# Patient Record
Sex: FEMALE | Race: WHITE | Employment: FULL TIME | URBAN - METROPOLITAN AREA
[De-identification: names, ages, dates, MRNs, and addresses within clinical notes are randomized per-mention and may not be internally consistent; named-entity substitution may affect disease eponyms.]

---

## 2017-04-08 ENCOUNTER — TRANSFERRED RECORDS (OUTPATIENT)
Dept: HEALTH INFORMATION MANAGEMENT | Facility: CLINIC | Age: 47
End: 2017-04-08

## 2018-01-28 ENCOUNTER — TRANSFERRED RECORDS (OUTPATIENT)
Dept: HEALTH INFORMATION MANAGEMENT | Facility: CLINIC | Age: 48
End: 2018-01-28

## 2018-02-03 ENCOUNTER — HEALTH MAINTENANCE LETTER (OUTPATIENT)
Age: 48
End: 2018-02-03

## 2018-07-06 ENCOUNTER — OFFICE VISIT (OUTPATIENT)
Dept: FAMILY MEDICINE | Facility: CLINIC | Age: 48
End: 2018-07-06
Payer: COMMERCIAL

## 2018-07-06 VITALS
SYSTOLIC BLOOD PRESSURE: 112 MMHG | TEMPERATURE: 97.9 F | BODY MASS INDEX: 21.24 KG/M2 | HEART RATE: 80 BPM | WEIGHT: 124.4 LBS | OXYGEN SATURATION: 100 % | DIASTOLIC BLOOD PRESSURE: 63 MMHG | HEIGHT: 64 IN

## 2018-07-06 DIAGNOSIS — Z11.3 SCREEN FOR STD (SEXUALLY TRANSMITTED DISEASE): ICD-10-CM

## 2018-07-06 DIAGNOSIS — Z13.6 CARDIOVASCULAR SCREENING; LDL GOAL LESS THAN 160: ICD-10-CM

## 2018-07-06 DIAGNOSIS — Z63.0 PARTNER RELATIONSHIP PROBLEMS: ICD-10-CM

## 2018-07-06 DIAGNOSIS — Z00.00 ENCOUNTER FOR ROUTINE ADULT HEALTH EXAMINATION WITHOUT ABNORMAL FINDINGS: Primary | ICD-10-CM

## 2018-07-06 DIAGNOSIS — N94.10 DYSPAREUNIA IN FEMALE: ICD-10-CM

## 2018-07-06 LAB
CHOLEST SERPL-MCNC: 159 MG/DL
GLUCOSE SERPL-MCNC: 106 MG/DL (ref 70–99)
HDLC SERPL-MCNC: 69 MG/DL
LDLC SERPL CALC-MCNC: 75 MG/DL
NONHDLC SERPL-MCNC: 90 MG/DL
T PALLIDUM AB SER QL: NONREACTIVE
TRIGL SERPL-MCNC: 73 MG/DL

## 2018-07-06 PROCEDURE — 82947 ASSAY GLUCOSE BLOOD QUANT: CPT | Performed by: FAMILY MEDICINE

## 2018-07-06 PROCEDURE — 86780 TREPONEMA PALLIDUM: CPT | Performed by: FAMILY MEDICINE

## 2018-07-06 PROCEDURE — 87624 HPV HI-RISK TYP POOLED RSLT: CPT | Performed by: FAMILY MEDICINE

## 2018-07-06 PROCEDURE — 87389 HIV-1 AG W/HIV-1&-2 AB AG IA: CPT | Performed by: FAMILY MEDICINE

## 2018-07-06 PROCEDURE — 80061 LIPID PANEL: CPT | Performed by: FAMILY MEDICINE

## 2018-07-06 PROCEDURE — 99396 PREV VISIT EST AGE 40-64: CPT | Performed by: FAMILY MEDICINE

## 2018-07-06 PROCEDURE — 87491 CHLMYD TRACH DNA AMP PROBE: CPT | Performed by: FAMILY MEDICINE

## 2018-07-06 PROCEDURE — 36415 COLL VENOUS BLD VENIPUNCTURE: CPT | Performed by: FAMILY MEDICINE

## 2018-07-06 PROCEDURE — G0145 SCR C/V CYTO,THINLAYER,RESCR: HCPCS | Performed by: FAMILY MEDICINE

## 2018-07-06 PROCEDURE — 87591 N.GONORRHOEAE DNA AMP PROB: CPT | Performed by: FAMILY MEDICINE

## 2018-07-06 SDOH — SOCIAL STABILITY - SOCIAL INSECURITY: PROBLEMS IN RELATIONSHIP WITH SPOUSE OR PARTNER: Z63.0

## 2018-07-06 NOTE — LETTER
July 13, 2018    Brianna Lew  [address] [facility] [place] [facility] [address]    Dear Brianna,  We are happy to inform you that your PAP smear result from 07/06/18 is normal.  We are now able to do a follow up test on PAP smears. The DNA test is for HPV (Human Papilloma Virus). Cervical cancer is closely linked with certain types of HPV. Your results showed no evidence of high risk HPV.  Therefore we recommend you return in 5 years for your next pap smear and HPV test.  You will still need to return to the clinic every year for an annual exam and other preventive tests.  Please contact the clinic at [phone] with any questions.  Sincerely,    Nia Chavez MD/[facility]

## 2018-07-06 NOTE — PATIENT INSTRUCTIONS
Preventive Health Recommendations  Female Ages 40 to 49    Yearly exam:     See your health care provider every year in order to  1. Review health changes.   2. Discuss preventive care.    3. Review your medicines if your doctor prescribed any.      Get a Pap test every three years (unless you have an abnormal result and your provider advises testing more often).      If you get Pap tests with HPV test, you only need to test every 5 years, unless you have an abnormal result. You do not need a Pap test if your uterus was removed (hysterectomy) and you have not had cancer.      You should be tested each year for STDs (sexually transmitted diseases), if you're at risk.     Ask your doctor if you should have a mammogram.      Have a colonoscopy (test for colon cancer) if someone in your family has had colon cancer or polyps before age 50.       Have a cholesterol test every 5 years.       Have a diabetes test (fasting glucose) after age 45. If you are at risk for diabetes, you should have this test every 3 years.    Shots: Get a flu shot each year. Get a tetanus shot every 10 years.     Nutrition:     Eat at least 5 servings of fruits and vegetables each day.    Eat whole-grain bread, whole-wheat pasta and brown rice instead of white grains and rice.    Get adequate Calcium and Vitamin D.      Lifestyle    Exercise at least 150 minutes a week (an average of 30 minutes a day, 5 days a week). This will help you control your weight and prevent disease.    Limit alcohol to one drink per day.    No smoking.     Wear sunscreen to prevent skin cancer.    See your dentist every six months for an exam and cleaning.    PLEASE CALL TO SCHEDULE YOUR MAMMOGRAM  [facility] [phone]  [facility] [phone]

## 2018-07-06 NOTE — MR AVS SNAPSHOT
After Visit Summary   7/6/2018    Brianna Lew    MRN: [id number]           Patient Information     Date Of Birth          1970        Visit Information        Provider Department      7/6/2018 9:30 AM Nia Chavez MD [facility]        Today's Diagnoses     Encounter for routine adult health examination without abnormal findings    -  1    CARDIOVASCULAR SCREENING; LDL GOAL LESS THAN 160        Screen for STD (sexually transmitted disease)          Care Instructions      Preventive Health Recommendations  Female Ages 40 to 49    Yearly exam:     See your health care provider every year in order to  1. Review health changes.   2. Discuss preventive care.    3. Review your medicines if your doctor prescribed any.      Get a Pap test every three years (unless you have an abnormal result and your provider advises testing more often).      If you get Pap tests with HPV test, you only need to test every 5 years, unless you have an abnormal result. You do not need a Pap test if your uterus was removed (hysterectomy) and you have not had cancer.      You should be tested each year for STDs (sexually transmitted diseases), if you're at risk.     Ask your doctor if you should have a mammogram.      Have a colonoscopy (test for colon cancer) if someone in your family has had colon cancer or polyps before age 50.       Have a cholesterol test every 5 years.       Have a diabetes test (fasting glucose) after age 45. If you are at risk for diabetes, you should have this test every 3 years.    Shots: Get a flu shot each year. Get a tetanus shot every 10 years.     Nutrition:     Eat at least 5 servings of fruits and vegetables each day.    Eat whole-grain bread, whole-wheat pasta and brown rice instead of white grains and rice.    Get adequate Calcium and Vitamin D.      Lifestyle    Exercise at least 150 minutes a week (an average of 30 minutes a day, 5 days a week). This will help  "you control your weight and prevent disease.    Limit alcohol to one drink per day.    No smoking.     Wear sunscreen to prevent skin cancer.    See your dentist every six months for an exam and cleaning.          Follow-ups after your visit        Who to contact     If you have questions or need follow up information about today's clinic visit or your schedule please contact [facility] directly at [phone].  Normal or non-critical lab and imaging results will be communicated to you by AdviceIQhart, letter or phone within 4 business days after the clinic has received the results. If you do not hear from us within 7 days, please contact the clinic through [employer] or phone. If you have a critical or abnormal lab result, we will notify you by phone as soon as possible.  Submit refill requests through Akermin or call your pharmacy and they will forward the refill request to us. Please allow 3 business days for your refill to be completed.          Additional Information About Your Visit        AdviceIQharPureLiFi Information     Akermin gives you secure access to your electronic health record. If you see a primary care provider, you can also send messages to your care team and make appointments. If you have questions, please call your primary care clinic.  If you do not have a primary care provider, please call [phone] and they will assist you.        Care EveryWhere ID     This is your Care EveryWhere ID. This could be used by other organizations to access your Veteran medical records  [id number]        Your Vitals Were     Pulse Temperature Height Pulse Oximetry Breastfeeding? BMI (Body Mass Index)    80 97.9  F (36.6  C) (Oral) 5' 3.5\" (1.613 m) 100% No 21.69 kg/m2       Blood Pressure from Last 3 Encounters:   07/06/18 112/63   06/08/16 108/68   12/08/15 110/62    Weight from Last 3 Encounters:   07/06/18 124 lb 6.4 oz (56.4 kg)   06/08/16 121 lb 6.4 oz (55.1 kg)   12/08/15 118 lb 14.4 oz (53.9 kg)            "   We Performed the Following     CHLAMYDIA TRACHOMATIS PCR     GLUCOSE     HIV Antigen Antibody Combo     HPV High Risk Types DNA Cervical     Lipid panel reflex to direct LDL Fasting     NEISSERIA GONORRHOEA PCR     Pap imaged thin layer screen with HPV - recommended age 30 - 65 years (select HPV order below)     Treponema Abs w Reflex to RPR and Titer        Primary Care Provider Office Phone # Fax #    Nia Chavez [phone] [address] [facility]  [address]  [facility] [address]        Equal Access to Services     KISHA CALHOUN : Hadii aad ku hadasho Soomaali, waaxda luqadaha, qaybta kaalmada adeegyada, waxay idiin hayaan adeeg kharash la'channing . So [facility] [phone].    ATENCIÓN: Si habla español, tiene a liu disposición servicios gratuitos de asistencia lingüística. [facility] [phone].    We comply with applicable federal civil rights laws and [place] laws. We do not discriminate on the basis of race, color, national origin, age, disability, sex, sexual orientation, or gender identity.            Thank you!     Thank you for choosing [facility]  for your care. Our goal is always to provide you with excellent care. Hearing back from our patients is one way we can continue to improve our services. Please take a few minutes to complete the written survey that you may receive in the mail after your visit with us. Thank you!             Your Updated Medication List - Protect others around you: Learn how to safely use, store and throw away your medicines at www.disposemymeds.org.          This list is accurate as of 7/6/18 10:29 AM.  Always use your most recent med list.                   Brand Name Dispense Instructions for use Diagnosis    NO ACTIVE MEDICATIONS

## 2018-07-06 NOTE — PROGRESS NOTES
SUBJECTIVE:   CC: Brianna Lew is an 47 year old woman who presents for preventive health visit.     Physical   Annual:     Getting at least 3 servings of Calcium per day:  NO    Bi-annual eye exam:  NO    Dental care twice a year:  Yes    Sleep apnea or symptoms of sleep apnea:  None    Diet:  Regular (no restrictions)    Frequency of exercise:  2-3 days/week    Duration of exercise:  30-45 minutes    Taking medications regularly:  Not Applicable    Additional concerns today:  No    Is fasting today- will check lipids/glucose.  Pap due next year, though since last pap, she's been dating a claudy who has a h/o HPV warts.  Due to his h/o, she elected to do the HPV vaccine (2 or the 3 shots) around the time they became sexually active.  After the vaccines, she would get yeast infx and UTI, though in discussion, she was getting these infections soon after becoming more sexually active as well.  She's now been with him for ~2-3 yrs.      She does have some concerns about the relationship- sounds like he has the tendency to be demanding and has a hard time with disagreements.  At first, she just went along with things to avoid conflict, now putting her foot down more.  Not allowing him to move in, or take relationship to next level unless they can do some good work with therapy.  Not started yet.   May end up ending the relationship, though wary of the dating scene as it's so tough as well.  She feels safe, no h/o physical or sexual abuse.  Verbal concerns- can be demeaning in talking to her in arguments.    No period for awhile- maybe a year?  Not using contraception or using protection as she has never gotten pregnant, even with fertility txts- few rounds to ovulate.        Today's PHQ-2 Score:   PHQ-2 ( 1999 Pf[employer]) 7/4/2018   Q1: Little interest or pleasure in doing things 0   Q2: Feeling down, depressed or hopeless 0   PHQ-2 Score 0   Q1: Little interest or pleasure in doing things Not at all   Q2: Feeling  down, depressed or hopeless Not at all   PHQ-2 Score 0     Abuse: Current or Past(Physical, Sexual or Emotional)- No  Do you feel safe in your environment - Yes    Social History   Substance Use Topics     Smoking status: Never Smoker     Smokeless tobacco: Never Used     Alcohol use 0.0 oz/week     0 Standard drinks or equivalent per week      Comment: social, 1 drink or less per day     Alcohol Use 7/4/2018   If you drink alcohol do you typically have greater than 3 drinks per day OR greater than 7 drinks per week? No   No flowsheet data found.    Reviewed orders with patient.  Reviewed health maintenance and updated orders accordingly - Yes  Labs reviewed in EPIC  BP Readings from Last 3 Encounters:   07/06/18 112/63   06/08/16 108/68   12/08/15 110/62    Wt Readings from Last 3 Encounters:   07/06/18 124 lb 6.4 oz (56.4 kg)   06/08/16 121 lb 6.4 oz (55.1 kg)   12/08/15 118 lb 14.4 oz (53.9 kg)                  Patient Active Problem List   Diagnosis     Primary localized osteoarthrosis, hand     CARDIOVASCULAR SCREENING; LDL GOAL LESS THAN 160     Female infertility of other specified origin     Microhematuria     Past Surgical History:   Procedure Laterality Date     HC TOOTH EXTRACTION W/FORCEP      wisdom teeth       Social History   Substance Use Topics     Smoking status: Never Smoker     Smokeless tobacco: Never Used     Alcohol use 0.0 oz/week     0 Standard drinks or equivalent per week      Comment: social, 1 drink or less per day     Family History   Problem Relation Age of Onset     Hypertension Father      Hypertension Paternal Grandmother      Hypertension Paternal Grandfather      Cerebrovascular Disease Maternal Grandmother 82     Cerebrovascular Disease Maternal Grandfather      passed away     Diabetes Paternal Grandfather      PEDROA.LOGAN. Paternal Grandfather 62     MI in early 60s     Cancer Paternal Uncle 54     pancreatic cancer- passed away at 54     Cancer Maternal Grandmother      skin      "Breast Cancer Maternal Aunt 66     HEART DISEASE Maternal Grandmother      CHF,  at 88     Cancer Paternal Aunt      tongue ca, smoker/etoh         Current Outpatient Prescriptions   Medication Sig Dispense Refill     NO ACTIVE MEDICATIONS        Allergies   Allergen Reactions     Sulfa Drugs Hives and Swelling     Facial swelling, hives, heart racing.     Recent Labs   Lab Test  18   1045  12   1312  12   1249   LDL  75   --   89   HDL  69   --   57   TRIG  73   --   81   ALT   --   21   --    CR   --   0.70   --    GFRESTIMATED   --   >90   --    GFRESTBLACK   --   >90   --    POTASSIUM   --   4.0   --    TSH   --   2.68   --         Patient under age 50, mutual decision reflected in health maintenance.      Pertinent mammograms are reviewed under the imaging tab.  History of abnormal Pap smear: NO - age 30-65 PAP every 5 years with negative HPV co-testing recommended  PAP / HPV 2014 2012 3/3/2009   PAP NIL NIL NIL     Reviewed and updated as needed this visit by clinical staff  Tobacco  Allergies  Meds         Reviewed and updated as needed this visit by Provider            Review of Systems  10 point ROS of systems including Constitutional, Eyes, Respiratory, Cardiovascular, Gastroenterology, Genitourinary, Integumentary, Muscularskeletal, Psychiatric were all negative except for pertinent positives noted in my HPI.       OBJECTIVE:   /63  Pulse 80  Temp 97.9  F (36.6  C) (Oral)  Ht 5' 3.5\" (1.613 m)  Wt 124 lb 6.4 oz (56.4 kg)  SpO2 100%  Breastfeeding? No  BMI 21.69 kg/m2  Physical Exam  GENERAL: healthy, alert and no distress  EYES: Eyes grossly normal to inspection, PERRL and conjunctivae and sclerae normal  HENT: ear canals and TM's normal, nose and mouth without ulcers or lesions  NECK: no adenopathy, no asymmetry, masses, or scars and thyroid normal to palpation  RESP: lungs clear to auscultation - no rales, rhonchi or wheezes  BREAST: normal without masses, " tenderness or nipple discharge and no palpable axillary masses or adenopathy  CV: regular rate and rhythm, normal S1 S2, no S3 or S4, no murmur, click or rub, no peripheral edema and peripheral pulses strong  ABDOMEN: soft, nontender, no hepatosplenomegaly, no masses and bowel sounds normal   (female): normal female external genitalia, normal urethral meatus, vaginal mucosa pink, moist, well rugated, and normal cervix/adnexa/uterus without masses or discharge  MS: no gross musculoskeletal defects noted, no edema  SKIN: no suspicious lesions or rashes  NEURO: Normal strength and tone, mentation intact and speech normal  PSYCH: mentation appears normal, affect normal/bright    Diagnostic Test Results:  Results for orders placed or performed in visit on 07/06/18   GLUCOSE   Result Value Ref Range    Glucose 106 (H) 70 - 99 mg/dL   Lipid panel reflex to direct LDL Fasting   Result Value Ref Range    Cholesterol 159 <200 mg/dL    Triglycerides 73 <150 mg/dL    HDL Cholesterol 69 >49 mg/dL    LDL Cholesterol Calculated 75 <100 mg/dL    Non HDL Cholesterol 90 <130 mg/dL   HIV Antigen Antibody Combo   Result Value Ref Range    HIV Antigen Antibody Combo Nonreactive NR^Nonreactive       Treponema Abs w Reflex to RPR and Titer   Result Value Ref Range    Treponema Antibodies Nonreactive NR^Nonreactive       ASSESSMENT/PLAN:       ICD-10-CM    1. Encounter for routine adult health examination without abnormal findings Z00.00 GLUCOSE     Pap imaged thin layer screen with HPV - recommended age 30 - 65 years (select HPV order below)     HPV High Risk Types DNA Cervical   2. CARDIOVASCULAR SCREENING; LDL GOAL LESS THAN 160 Z13.6 Lipid panel reflex to direct LDL Fasting   3. Screen for STD (sexually transmitted disease) Z11.3 NEISSERIA GONORRHOEA PCR     CHLAMYDIA TRACHOMATIS PCR     HIV Antigen Antibody Combo     Treponema Abs w Reflex to RPR and Titer   4. Dyspareunia in female N94.10    5. Partner relationship problems Z63.0   "    CPE - Discussed diet, calcium and exercise.  Went over Self Breast Exam.  Thin prep pap was done.  She has a h/o dyspareunia, bit better with more frequent intercourse.  In past has been intolerant of pap smear (even w/ pediatric speculum)- this time did well with pediatric speculum.  Eyes and Teeth or UTD or recommended f/u.  No immunizations needed today.  See orders below for tests ordered and screening needed.   Will do fasting lipid/glucose today and STD screen.  Pt has some concerns re: current relationship of a couple yrs.  Feels safe, but does voice real concerns about him being controlling.  Planning on doing counseling to move forward, not sure she won't end it.  Will call if she would like a referral for therapy.        Answers for HPI/ROS submitted by the patient on 7/4/2018   PHQ-2 Score: 0.        COUNSELING:  Reviewed preventive health counseling, as reflected in patient instructions    BP Readings from Last 1 Encounters:   07/06/18 112/63     Estimated body mass index is 21.69 kg/(m^2) as calculated from the following:    Height as of this encounter: 5' 3.5\" (1.613 m).    Weight as of this encounter: 124 lb 6.4 oz (56.4 kg).       reports that she has never smoked. She has never used smokeless tobacco.      Counseling Resources:  ATP IV Guidelines  Pooled Cohorts Equation Calculator  Breast Cancer Risk Calculator  FRAX Risk Assessment  ICSI Preventive Guidelines  Dietary Guidelines for Americans, 2010  USDA's MyPlate  ASA Prophylaxis  Lung CA Screening    Nia Chavez MD  [facility]  "

## 2018-07-08 LAB — HIV 1+2 AB+HIV1 P24 AG SERPL QL IA: NONREACTIVE

## 2018-07-10 LAB
C TRACH DNA SPEC QL NAA+PROBE: NEGATIVE
COPATH REPORT: NORMAL
N GONORRHOEA DNA SPEC QL NAA+PROBE: NEGATIVE
PAP: NORMAL
SPECIMEN SOURCE: NORMAL
SPECIMEN SOURCE: NORMAL

## 2018-07-12 LAB
FINAL DIAGNOSIS: NORMAL
HPV HR 12 DNA CVX QL NAA+PROBE: NEGATIVE
HPV16 DNA SPEC QL NAA+PROBE: NEGATIVE
HPV18 DNA SPEC QL NAA+PROBE: NEGATIVE
SPECIMEN DESCRIPTION: NORMAL
SPECIMEN SOURCE CVX/VAG CYTO: NORMAL

## 2018-07-12 NOTE — PROGRESS NOTES
Here are your lab results from your recent visit...  -Your STD labs all came back negative (gonorrhea, chlamydia, HIV and syphilis).  -Your cholesterol panel looks very good with a low LDL (the bad cholesterol) and a high HDL (the good cholesterol).   -Your glucose is just slightly high at 106 (<100 is normal) as I believe you were fasting?  If you weren't completely fasting, it is fine.  We should just check this again next year when you are fasting.     It was good to see you!  Please let me know if you have any questions.  Best,   Gulshan Chavez MD

## 2018-07-16 PROBLEM — Z63.0 PARTNER RELATIONSHIP PROBLEMS: Status: ACTIVE | Noted: 2018-07-16

## 2018-12-17 ENCOUNTER — HOSPITAL ENCOUNTER (OUTPATIENT)
Dept: MAMMOGRAPHY | Facility: CLINIC | Age: 48
Discharge: HOME | End: 2018-12-17
Attending: FAMILY MEDICINE | Admitting: FAMILY MEDICINE
Payer: COMMERCIAL

## 2018-12-17 ENCOUNTER — MYC MEDICAL ADVICE (OUTPATIENT)
Dept: FAMILY MEDICINE | Facility: CLINIC | Age: 48
End: 2018-12-17

## 2018-12-17 DIAGNOSIS — Z12.31 VISIT FOR SCREENING MAMMOGRAM: ICD-10-CM

## 2018-12-17 PROCEDURE — 77063 BREAST TOMOSYNTHESIS BI: CPT

## 2019-01-29 ENCOUNTER — OFFICE VISIT (OUTPATIENT)
Dept: FAMILY MEDICINE | Facility: CLINIC | Age: 49
End: 2019-01-29
Payer: COMMERCIAL

## 2019-01-29 VITALS
DIASTOLIC BLOOD PRESSURE: 77 MMHG | WEIGHT: 127.6 LBS | SYSTOLIC BLOOD PRESSURE: 123 MMHG | HEART RATE: 82 BPM | HEIGHT: 64 IN | BODY MASS INDEX: 21.78 KG/M2 | TEMPERATURE: 98 F | OXYGEN SATURATION: 100 %

## 2019-01-29 DIAGNOSIS — H61.21 IMPACTED CERUMEN OF RIGHT EAR: Primary | ICD-10-CM

## 2019-01-29 DIAGNOSIS — Z63.9 RELATIONSHIP DYSFUNCTION: ICD-10-CM

## 2019-01-29 PROCEDURE — 99213 OFFICE O/P EST LOW 20 MIN: CPT | Performed by: FAMILY MEDICINE

## 2019-01-29 SDOH — SOCIAL STABILITY - SOCIAL INSECURITY: PROBLEM RELATED TO PRIMARY SUPPORT GROUP, UNSPECIFIED: Z63.9

## 2019-01-29 ASSESSMENT — MIFFLIN-ST. JEOR: SCORE: 1190.85

## 2019-01-29 NOTE — NURSING NOTE
"Chief Complaint   Patient presents with     Ear Problem     /77   Pulse 82   Temp 98  F (36.7  C) (Oral)   Ht 1.613 m (5' 3.5\")   Wt 57.9 kg (127 lb 9.6 oz)   SpO2 100%   BMI 22.25 kg/m   Estimated body mass index is 22.25 kg/m  as calculated from the following:    Height as of this encounter: 1.613 m (5' 3.5\").    Weight as of this encounter: 57.9 kg (127 lb 9.6 oz).  Medication Reconciliation: complete      Health Maintenance that is potentially due pending provider review:  NONE    n/a    OSCAR Loja  "

## 2019-01-29 NOTE — PROGRESS NOTES
SUBJECTIVE:   Brianna Lew is a 48 year old female who presents to clinic today for the following health issues:    Ear Problem     Duration: x5-6 days ago    Description (location/character/radiation): RT ear     Intensity:  moderate    Accompanying signs and symptoms: RT ear feels plugged - sx worse in the morning     History (similar episodes/previous evaluation): None    Precipitating or alleviating factors: None    Therapies tried and outcome: None     --Grandmother would have to come in to get ears flushed.  Sister goes in to get ears cleared out as well.  Awhile ago, if she pressed on her lower ear, it felt like ear canal was opening/closing.  Last week, when she woke up, it felt like her sinuses have been oozing into her ears.  Gets better throughout the day.  Friday- felt worse, echoing, tried to pop her ears, and it suddenly popped and felt a lot better.  Each day since then, though, it's gotten a bit worse.      --Got mammogram done- same day she heard about her MCousin getting dx with breast cancer recently- cousin is 35 yrs old.      --Relationship issues- feels things are a bit better than at last visit, but still having trouble with communication, and finds herself avoiding tougher conversations to avoid bad reactions on his part.  She does feel he's trying, though, and knows it's mostly how he was brought up (met his family which was helpful to see for her). She thinks he would be open and willing to do couples counseling, and would be interested in a referral today.      Problem list and histories reviewed & adjusted, as indicated.  Additional history: as documented      Patient Active Problem List   Diagnosis     Primary localized osteoarthrosis, hand     CARDIOVASCULAR SCREENING; LDL GOAL LESS THAN 160     Female infertility of other specified origin     Microhematuria     Dyspareunia in female     Partner relationship problems     Past Surgical History:   Procedure Laterality Date     HC  TOOTH EXTRACTION W/FORCEP      wisdom teeth       Social History     Tobacco Use     Smoking status: Never Smoker     Smokeless tobacco: Never Used   Substance Use Topics     Alcohol use: Yes     Alcohol/week: 0.0 oz     Comment: social, 1 drink or less per day     Family History   Problem Relation Age of Onset     Hypertension Father      Cerebrovascular Disease Maternal Grandmother 82     Cancer Maternal Grandmother         skin     Heart Disease Maternal Grandmother         CHF,  at 88     Cerebrovascular Disease Maternal Grandfather         passed away     Hypertension Paternal Grandmother      Hypertension Paternal Grandfather      Diabetes Paternal Grandfather      C.A.D. Paternal Grandfather 62        MI in early 60s     Cancer Paternal Uncle 54        pancreatic cancer- passed away at 54     Breast Cancer Maternal Aunt 66     Cancer Paternal Aunt         tongue ca, smoker/etoh     Breast Cancer Maternal Cousin 35        mastectomy, chemo planned         Current Outpatient Medications   Medication Sig Dispense Refill     NO ACTIVE MEDICATIONS        Allergies   Allergen Reactions     Sulfa Drugs Hives and Swelling     Facial swelling, hives, heart racing.     Recent Labs   Lab Test 18  1045 12  1312 12  1249   LDL 75  --  89   HDL 69  --  57   TRIG 73  --  81   ALT  --  21  --    CR  --  0.70  --    GFRESTIMATED  --  >90  --    GFRESTBLACK  --  >90  --    POTASSIUM  --  4.0  --    TSH  --  2.68  --       BP Readings from Last 3 Encounters:   19 123/77   18 112/63   16 108/68    Wt Readings from Last 3 Encounters:   19 57.9 kg (127 lb 9.6 oz)   18 56.4 kg (124 lb 6.4 oz)   16 55.1 kg (121 lb 6.4 oz)            Labs reviewed in EPIC    Reviewed and updated as needed this visit by clinical staff  Tobacco  Allergies  Meds  Problems  Med Hx  Surg Hx  Fam Hx       Reviewed and updated as needed this visit by Provider  Tobacco  Allergies  Meds   "Problems  Med Hx  Surg Hx  Fam Hx         ROS:  Constitutional, HEENT, cardiovascular, pulmonary, gi and gu systems are negative, except as otherwise noted.    OBJECTIVE:     /77   Pulse 82   Temp 98  F (36.7  C) (Oral)   Ht 1.613 m (5' 3.5\")   Wt 57.9 kg (127 lb 9.6 oz)   SpO2 100%   BMI 22.25 kg/m    Body mass index is 22.25 kg/m .  GENERAL APPEARANCE: pleasant, alert and no distress     EYES: PERRL, sclera clear     HENT: nares clear, oropharynx without erythema, swelling or exudate, sinuses non-tender to palpation, right ear canal filled with soft wax, L canal mostly clear, and TM normal with good light reflex, R TM normal after canal cleared     NECK: no adenopathy, no asymmetry, masses, or scars and thyroid normal to palpation     RESP: lungs clear to auscultation - no rales, rhonchi or wheezes     CV: regular rates and rhythm, normal S1 S2, no S3 or S4 and no murmur, click or rub      Ext: warm, dry, no edema     Psych: full range affect, normal speech and grooming, judgement and insight intact     Diagnostic Test Results:  Results for orders placed or performed during the hospital encounter of 12/17/18   MA Screen Bilateral w/Donta    Narrative    Examination: Bilateral digital screening mammography with computer  aided detection including digital breast tomosynthesis, 12/17/2018  3:25 PM.    Comparison: 03/01/2007    History: No current breast concerns. Maternal aunt and cousin with  breast cancer.    BREAST DENSITY: Heterogeneously dense.    COMMENTS:  No suspicious finding.      Impression    IMPRESSION: BI-RADS CATEGORY: 1 -  NEGATIVE.    RECOMMENDED FOLLOW-UP: Annual Mammography.      The patient will be notified of the results.     JOVITA HORNE MD       ASSESSMENT/PLAN:       ICD-10-CM    1. Impacted cerumen of right ear H61.21    2. Relationship dysfunction Z63.9 MENTAL HEALTH REFERRAL  - Adult; Outpatient Treatment; Individual/Couples/Family/Group Therapy/Health Psychology; [facility]: " [facility] [phone]; We will contact you to schedule the appointment or please call with any questions     Cerumen in R ear- blocked, but cleared with ear flushing, with significant improvement in hearing and exam normal afterwards.  Discussed option of occasional use of debrox OTC if she'd like another option if sx's return, or can come in for ear flushing prn as well.    Relationship communication issues- issues may be a bit better, but they continue, and inhibit communication.  Pt interested in referral for couples counseling- referral placed.    Recent mammogram normal.    Nia Chavez MD  [facility] [place]

## 2019-10-04 ENCOUNTER — HEALTH MAINTENANCE LETTER (OUTPATIENT)
Age: 49
End: 2019-10-04

## 2019-11-14 ENCOUNTER — OFFICE VISIT (OUTPATIENT)
Dept: FAMILY MEDICINE | Facility: CLINIC | Age: 49
End: 2019-11-14
Payer: COMMERCIAL

## 2019-11-14 VITALS
TEMPERATURE: 98.4 F | WEIGHT: 120.2 LBS | HEIGHT: 64 IN | BODY MASS INDEX: 20.52 KG/M2 | SYSTOLIC BLOOD PRESSURE: 136 MMHG | RESPIRATION RATE: 15 BRPM | DIASTOLIC BLOOD PRESSURE: 73 MMHG | HEART RATE: 89 BPM | OXYGEN SATURATION: 100 %

## 2019-11-14 DIAGNOSIS — R19.7 DIARRHEA OF PRESUMED INFECTIOUS ORIGIN: Primary | ICD-10-CM

## 2019-11-14 LAB
ALBUMIN SERPL-MCNC: 4.1 G/DL (ref 3.4–5)
ALP SERPL-CCNC: 79 U/L (ref 40–150)
ALT SERPL W P-5'-P-CCNC: 36 U/L (ref 0–50)
ANION GAP SERPL CALCULATED.3IONS-SCNC: 7 MMOL/L (ref 3–14)
AST SERPL W P-5'-P-CCNC: 16 U/L (ref 0–45)
BASOPHILS # BLD AUTO: 0 10E9/L (ref 0–0.2)
BASOPHILS NFR BLD AUTO: 0.3 %
BILIRUB SERPL-MCNC: 0.4 MG/DL (ref 0.2–1.3)
BUN SERPL-MCNC: 7 MG/DL (ref 7–30)
CALCIUM SERPL-MCNC: 8.8 MG/DL (ref 8.5–10.1)
CHLORIDE SERPL-SCNC: 106 MMOL/L (ref 94–109)
CO2 SERPL-SCNC: 25 MMOL/L (ref 20–32)
CREAT SERPL-MCNC: 0.72 MG/DL (ref 0.52–1.04)
DIFFERENTIAL METHOD BLD: ABNORMAL
EOSINOPHIL # BLD AUTO: 0 10E9/L (ref 0–0.7)
EOSINOPHIL NFR BLD AUTO: 0.6 %
ERYTHROCYTE [DISTWIDTH] IN BLOOD BY AUTOMATED COUNT: 12.6 % (ref 10–15)
GFR SERPL CREATININE-BSD FRML MDRD: >90 ML/MIN/{1.73_M2}
GLUCOSE SERPL-MCNC: 115 MG/DL (ref 70–99)
HCT VFR BLD AUTO: 38.9 % (ref 35–47)
HGB BLD-MCNC: 12.8 G/DL (ref 11.7–15.7)
LYMPHOCYTES # BLD AUTO: 0.9 10E9/L (ref 0.8–5.3)
LYMPHOCYTES NFR BLD AUTO: 25.8 %
MCH RBC QN AUTO: 28.8 PG (ref 26.5–33)
MCHC RBC AUTO-ENTMCNC: 32.9 G/DL (ref 31.5–36.5)
MCV RBC AUTO: 88 FL (ref 78–100)
MONOCYTES # BLD AUTO: 0.2 10E9/L (ref 0–1.3)
MONOCYTES NFR BLD AUTO: 4.7 %
NEUTROPHILS # BLD AUTO: 2.5 10E9/L (ref 1.6–8.3)
NEUTROPHILS NFR BLD AUTO: 68.6 %
PLATELET # BLD AUTO: 262 10E9/L (ref 150–450)
POTASSIUM SERPL-SCNC: 3.4 MMOL/L (ref 3.4–5.3)
PROT SERPL-MCNC: 7.3 G/DL (ref 6.8–8.8)
RBC # BLD AUTO: 4.44 10E12/L (ref 3.8–5.2)
SODIUM SERPL-SCNC: 138 MMOL/L (ref 133–144)
WBC # BLD AUTO: 3.6 10E9/L (ref 4–11)

## 2019-11-14 PROCEDURE — 99213 OFFICE O/P EST LOW 20 MIN: CPT | Performed by: PHYSICIAN ASSISTANT

## 2019-11-14 PROCEDURE — 80053 COMPREHEN METABOLIC PANEL: CPT | Performed by: PHYSICIAN ASSISTANT

## 2019-11-14 PROCEDURE — 36415 COLL VENOUS BLD VENIPUNCTURE: CPT | Performed by: PHYSICIAN ASSISTANT

## 2019-11-14 PROCEDURE — 85025 COMPLETE CBC W/AUTO DIFF WBC: CPT | Performed by: PHYSICIAN ASSISTANT

## 2019-11-14 ASSESSMENT — MIFFLIN-ST. JEOR: SCORE: 1152.28

## 2019-11-14 NOTE — PROGRESS NOTES
"Subjective     Brianna Lew is a 49 year old female who presents to clinic today for the following health issues:    HPI   Diarrhea      Duration: 5 days    Description:       Consistency of stool: varying consistencies; sometimes has been very light (almost grayish white)        Blood in stool: no        Number of loose stools past 24 hours: 2    Intensity:  mild    Accompanying signs and symptoms:       Fever: no        Nausea/vomitting: YES       Abdominal pain: no        Weight loss: no     History (recent antibiotics or travel/ill contacts/med changes/testing done): restaurant food    Precipitating or alleviating factors: none    Therapies tried and outcome: pt has been avoiding certain foods and trying to drink lots of fluids.        BP Readings from Last 3 Encounters:   11/14/19 136/73   01/29/19 123/77   07/06/18 112/63    Wt Readings from Last 3 Encounters:   11/14/19 54.5 kg (120 lb 3.2 oz)   01/29/19 57.9 kg (127 lb 9.6 oz)   07/06/18 56.4 kg (124 lb 6.4 oz)                      Reviewed and updated as needed this visit by Provider         Review of Systems   ROS COMP: Constitutional, HEENT, cardiovascular, pulmonary, gi and gu systems are negative, except as otherwise noted.      Objective    /73   Pulse 89   Temp 98.4  F (36.9  C) (Oral)   Resp 15   Ht 1.613 m (5' 3.5\")   Wt 54.5 kg (120 lb 3.2 oz)   LMP 10/01/2019 (Approximate)   SpO2 100%   BMI 20.96 kg/m    Body mass index is 20.96 kg/m .  Physical Exam   GENERAL: alert and no distress  EYES: Eyes grossly normal to inspection  HENT: ear canals and TM's normal, nose and mouth without ulcers or lesions  PSYCH: mentation appears normal, affect normal/bright    Diagnostic Test Results:  Labs reviewed in Epic        Assessment & Plan     1. Diarrhea of presumed infectious origin  May still be resolving viral, but will start with some labs and possible stool cultures.    - CBC with platelets differential  - Comprehensive metabolic " panel  - Enteric Bacteria and Virus Panel by [facility] St[facility]; Future           Return in about 1 week (around 11/21/2019) for If symptoms persist or worsen.    Bryan Grubbs PA-C  [facility]

## 2019-11-14 NOTE — NURSING NOTE
"Chief Complaint   Patient presents with     Gastrointestinal Problem     Diarrhea x5 days off and on (has concerns of food poisoning)     /73   Pulse 89   Temp 98.4  F (36.9  C) (Oral)   Resp 15   Ht 1.613 m (5' 3.5\")   Wt 54.5 kg (120 lb 3.2 oz)   LMP 10/01/2019 (Approximate)   SpO2 100%   BMI 20.96 kg/m   Estimated body mass index is 20.96 kg/m  as calculated from the following:    Height as of this encounter: 1.613 m (5' 3.5\").    Weight as of this encounter: 54.5 kg (120 lb 3.2 oz).  Medication Reconciliation: complete        Health Maintenance Due Pending Provider Review:  NONE    n/a    Jillian Montague MA  [facility]  [phone]  "

## 2019-11-16 DIAGNOSIS — R19.7 DIARRHEA OF PRESUMED INFECTIOUS ORIGIN: ICD-10-CM

## 2019-11-16 PROCEDURE — 87506 IADNA-DNA/RNA PROBE TQ 6-11: CPT | Performed by: PHYSICIAN ASSISTANT

## 2019-11-18 LAB
C COLI+JEJUNI+LARI FUSA STL QL NAA+PROBE: NOT DETECTED
EC STX1 GENE STL QL NAA+PROBE: NOT DETECTED
EC STX2 GENE STL QL NAA+PROBE: NOT DETECTED
ENTERIC PATHOGEN COMMENT: ABNORMAL
NOROV GI+II ORF1-ORF2 JNC STL QL NAA+PR: ABNORMAL
RVA NSP5 STL QL NAA+PROBE: NOT DETECTED
SALMONELLA SP RPOD STL QL NAA+PROBE: NOT DETECTED
SHIGELLA SP+EIEC IPAH STL QL NAA+PROBE: NOT DETECTED
V CHOL+PARA RFBL+TRKH+TNAA STL QL NAA+PR: NOT DETECTED
Y ENTERO RECN STL QL NAA+PROBE: NOT DETECTED

## 2019-11-20 ENCOUNTER — TELEPHONE (OUTPATIENT)
Dept: FAMILY MEDICINE | Facility: CLINIC | Age: 49
End: 2019-11-20

## 2019-11-20 NOTE — TELEPHONE ENCOUNTER
Reason for Call:  Other Return to work    Detailed comments: Brianna calling in to see if she is ok to return to work after the lab detected Norovirus.     Please call back.     Phone Number Patient can be reached at: Cell number on file:    Telephone Information:   Mobile [phone]       Best Time: any    Can we leave a detailed message on this number? YES    Call taken on 11/20/2019 at 9:34 AM by Anton Pillai

## 2019-11-20 NOTE — TELEPHONE ENCOUNTER
JS    Spoke with patient.  Asking if ok to return to work  States she could not reply to you [employer] message regarding lab result.    States she is feeling much better.  Has low energy due to how sick she was last week and not eating  Started eating food on Monday 11/18 and now eating regular diet without difficulty.    Returned to work today.  Reviewed what Norovirus and how people contact it.    Informed patient fine to return as long as feeling better back, back to eating.    Advised patient get as much rest if she can if she is feeling fatigued.     Anything you want to add?  Brandie Melchor, PHILL

## 2019-11-26 ENCOUNTER — OFFICE VISIT (OUTPATIENT)
Dept: FAMILY MEDICINE | Facility: CLINIC | Age: 49
End: 2019-11-26
Payer: COMMERCIAL

## 2019-11-26 VITALS
HEIGHT: 64 IN | WEIGHT: 121.13 LBS | TEMPERATURE: 98 F | HEART RATE: 76 BPM | OXYGEN SATURATION: 100 % | DIASTOLIC BLOOD PRESSURE: 68 MMHG | SYSTOLIC BLOOD PRESSURE: 118 MMHG | RESPIRATION RATE: 14 BRPM | BODY MASS INDEX: 20.68 KG/M2

## 2019-11-26 DIAGNOSIS — R31.29 MICROHEMATURIA: ICD-10-CM

## 2019-11-26 DIAGNOSIS — Z63.0 PARTNER RELATIONSHIP PROBLEMS: ICD-10-CM

## 2019-11-26 DIAGNOSIS — N94.10 DYSPAREUNIA IN FEMALE: ICD-10-CM

## 2019-11-26 DIAGNOSIS — A08.11 NOROVIRUS: ICD-10-CM

## 2019-11-26 DIAGNOSIS — Z13.6 CARDIOVASCULAR SCREENING; LDL GOAL LESS THAN 160: ICD-10-CM

## 2019-11-26 DIAGNOSIS — Z00.00 ROUTINE GENERAL MEDICAL EXAMINATION AT A HEALTH CARE FACILITY: Primary | ICD-10-CM

## 2019-11-26 PROCEDURE — 99396 PREV VISIT EST AGE 40-64: CPT | Performed by: FAMILY MEDICINE

## 2019-11-26 SDOH — SOCIAL STABILITY - SOCIAL INSECURITY: PROBLEMS IN RELATIONSHIP WITH SPOUSE OR PARTNER: Z63.0

## 2019-11-26 ASSESSMENT — MIFFLIN-ST. JEOR: SCORE: 1156.48

## 2019-11-26 ASSESSMENT — PAIN SCALES - GENERAL: PAINLEVEL: NO PAIN (0)

## 2019-11-26 NOTE — PATIENT INSTRUCTIONS
PLEASE CALL TO SCHEDULE YOUR MAMMOGRAM  [facility] [phone]  [facility] [phone]  [facility]   [phone]  Central Scheduling (all locations) [phone]    If you are under/uninsured, we recommend you contact the [facility]. They offer mammograms on a sliding fee charge. You can schedule with them at [phone].         Preventive Health Recommendations  Female Ages 40 to 49    Yearly exam:     See your health care provider every year in order to  1. Review health changes.   2. Discuss preventive care.    3. Review your medicines if your doctor prescribed any.      Get a Pap test every three years (unless you have an abnormal result and your provider advises testing more often).      If you get Pap tests with HPV test, you only need to test every 5 years, unless you have an abnormal result. You do not need a Pap test if your uterus was removed (hysterectomy) and you have not had cancer.      You should be tested each year for STDs (sexually transmitted diseases), if you're at risk.     Ask your doctor if you should have a mammogram.      Have a colonoscopy (test for colon cancer) if someone in your family has had colon cancer or polyps before age 50.       Have a cholesterol test every 5 years.       Have a diabetes test (fasting glucose) after age 45. If you are at risk for diabetes, you should have this test every 3 years.    Shots: Get a flu shot each year. Get a tetanus shot every 10 years.     Nutrition:     Eat at least 5 servings of fruits and vegetables each day.    Eat whole-grain bread, whole-wheat pasta and brown rice instead of white grains and rice.    Get adequate Calcium and Vitamin D.      Lifestyle    Exercise at least 150 minutes a week (an average of 30 minutes a day, 5 days a week). This will help you control your weight and prevent disease.    Limit alcohol to one drink per day.    No smoking.     Wear sunscreen to prevent skin  cancer.    See your dentist every six months for an exam and cleaning.

## 2019-11-26 NOTE — NURSING NOTE
"Chief Complaint   Patient presents with     Physical     /68 (BP Location: Left arm, Patient Position: Sitting, Cuff Size: Adult Regular)   Pulse 76   Temp 98  F (36.7  C) (Oral)   Resp 14   Ht 1.613 m (5' 3.5\")   Wt 54.9 kg (121 lb 2 oz)   LMP  (Approximate)   SpO2 100%   Breastfeeding No   BMI 21.12 kg/m   Estimated body mass index is 21.12 kg/m  as calculated from the following:    Height as of this encounter: 1.613 m (5' 3.5\").    Weight as of this encounter: 54.9 kg (121 lb 2 oz).  bp completed using cuff size: regular      Health Maintenance addressed:  NONE    N/a  Sunita Daniels CMA on 11/26/2019 at 2:38 PM                "

## 2019-11-26 NOTE — PROGRESS NOTES
SUBJECTIVE:   CC: Brianna Lew is an 49 year old woman who presents for preventive health visit.     Healthy Habits:     Getting at least 3 servings of Calcium per day:  Yes    Bi-annual eye exam:  NO    Dental care twice a year:  Yes    Sleep apnea or symptoms of sleep apnea:  None    Diet:  Regular (no restrictions)    Frequency of exercise:  2-3 days/week    Duration of exercise:  30-45 minutes    Taking medications regularly:  Not Applicable    Barriers to taking medications:  Not applicable    PHQ-2 Total Score: 0    Additional concerns today:  Yes    Thinks she got food poisoning ~2 wks ago.  Vomited that night (after salad at [employer]), diarrhea that night, and next day.  Then another round of diarrhea a few days later.  Still feeling a little nausea, but hasn't had diarrhea for several days.  She's been eating better the past week.    She did come to be seen here at clinic, did stool testing- positive for norovirus.   No txt, just gradually improved.  Did lose ~8 lbs at the worst of it however.    Will rtc for fasting labs.    She does take Vit D and a MVI.  Takes them most days.    Dyspareunia-   PT helped, yoga helped, more frequent intercourse helped.  Now it's more painful again- has been doing less of everything.    She continues to have relationship issues with her partner- with him for several yrs now, but won't move in with him or consider marriage as he's not a great partner.  Major reservations about him, though not enough to break things off with him.  For example, he shut off his phone [place], finally called her when when he got home which was when she was really ill with norovirus, but then wouldn't come over to bring her food even though she was out of anything to eat and was too ill to go out.  She knows she'd have done it for him, and she's also asked him to not shut off his phone when he goes away, but he won't do  it.    For the relationship concerns, she's had a referral for therapy in the past, but hasn't acted on it.  Now thinks she'd be interested in individual counseling- needs to figure out where she's at in all of it before couples.    For the dypareunia, she has the PT exercises/tools- feels like she can do them on her own at home, doesn't want a new PT referral at least at this point.      Upper back tightness.  Massage therapist- feels like it really helps her upper back, especially if she goes monthly.  He also did some pelvic/solar plexis massage when he heard about her pelvic discomfort- unsure at this point yet if it will help or not with the dyspareunia.  [facility]- was at [facility] in the past.          Today's PHQ-2 Score:   PHQ-2 ( 1999 [employer]) 11/26/2019   Q1: Little interest or pleasure in doing things 0   Q2: Feeling down, depressed or hopeless 0   PHQ-2 Score 0   Q1: Little interest or pleasure in doing things Not at all   Q2: Feeling down, depressed or hopeless Not at all   PHQ-2 Score 0       Abuse: Current or Past(Physical, Sexual or Emotional)- No  Do you feel safe in your environment? Yes        Social History     Tobacco Use     Smoking status: Never Smoker     Smokeless tobacco: Never Used   Substance Use Topics     Alcohol use: Yes     Alcohol/week: 0.0 standard drinks     Comment: social, 1 drink or less per day     If you drink alcohol do you typically have >3 drinks per day or >7 drinks per week? No    Alcohol Use 11/26/2019   Prescreen: >3 drinks/day or >7 drinks/week? No   Prescreen: >3 drinks/day or >7 drinks/week? -   No flowsheet data found.    Reviewed orders with patient.  Reviewed health maintenance and updated orders accordingly - Yes    Lab work is in process  Labs reviewed in EPIC  BP Readings from Last 3 Encounters:   11/26/19 118/68   11/14/19 136/73   01/29/19 123/77    Wt Readings from Last 3 Encounters:   11/26/19 54.9 kg (121 lb 2 oz)   11/14/19 54.5 kg (120  lb 3.2 oz)   19 57.9 kg (127 lb 9.6 oz)                  Patient Active Problem List   Diagnosis     Primary localized osteoarthrosis, hand     CARDIOVASCULAR SCREENING; LDL GOAL LESS THAN 160     Female infertility of other specified origin     Microhematuria     Dyspareunia in female     Partner relationship problems     Past Surgical History:   Procedure Laterality Date     HC TOOTH EXTRACTION W/FORCEP      wisdom teeth       Social History     Tobacco Use     Smoking status: Never Smoker     Smokeless tobacco: Never Used   Substance Use Topics     Alcohol use: Yes     Alcohol/week: 0.0 standard drinks     Comment: social, 1 drink or less per day     Family History   Problem Relation Age of Onset     Hypertension Father      Cerebrovascular Disease Maternal Grandmother 82     Cancer Maternal Grandmother         skin     Heart Disease Maternal Grandmother         CHF,  at 88     Cerebrovascular Disease Maternal Grandfather         passed away     Hypertension Paternal Grandmother      Hypertension Paternal Grandfather      Diabetes Paternal Grandfather      C.A.D. Paternal Grandfather 62        MI in early 60s     Cancer Paternal Uncle 54        pancreatic cancer- passed away at 54     Breast Cancer Maternal Aunt 66     Cancer Paternal Aunt         tongue ca, smoker/etoh     Breast Cancer Maternal Cousin 35        mastectomy, chemo planned         Current Outpatient Medications   Medication Sig Dispense Refill     NO ACTIVE MEDICATIONS        Allergies   Allergen Reactions     Sulfa Drugs Hives and Swelling     Facial swelling, hives, heart racing.     Recent Labs   Lab Test 19  0832 18  1045 12  1312 12  1249   LDL  --  75  --  89   HDL  --  69  --  57   TRIG  --  73  --  81   ALT 36  --  21  --    CR 0.72  --  0.70  --    GFRESTIMATED >90  --  >90  --    GFRESTBLACK >90  --  >90  --    POTASSIUM 3.4  --  4.0  --    TSH  --   --  2.68  --         Mammogram Screening: Patient  "under age 50, mutual decision reflected in health maintenance.      Pertinent mammograms are reviewed under the imaging tab.  History of abnormal Pap smear: NO - age 30-65 PAP every 5 years with negative HPV co-testing recommended  PAP / HPV Latest Ref Rng & Units 7/6/2018 12/2/2014 2/24/2012   PAP - NIL NIL NIL   HPV 16 DNA NEG:Negative Negative - -   HPV 18 DNA NEG:Negative Negative - -   OTHER HR HPV NEG:Negative Negative - -     Reviewed and updated as needed this visit by clinical staff  Tobacco  Allergies  Meds  Problems  Med Hx  Surg Hx  Fam Hx         Reviewed and updated as needed this visit by Provider  Tobacco  Allergies  Meds  Problems  Med Hx  Surg Hx  Fam Hx            Review of Systems  10 point ROS of systems including Constitutional, Eyes, Respiratory, Cardiovascular, Gastroenterology, Genitourinary, Integumentary, Muscularskeletal, Psychiatric were all negative except for pertinent positives noted in my HPI.       OBJECTIVE:   /68 (BP Location: Left arm, Patient Position: Sitting, Cuff Size: Adult Regular)   Pulse 76   Temp 98  F (36.7  C) (Oral)   Resp 14   Ht 1.613 m (5' 3.5\")   Wt 54.9 kg (121 lb 2 oz)   LMP  (Approximate)   SpO2 100%   Breastfeeding No   BMI 21.12 kg/m    Physical Exam  GENERAL: healthy, alert and no distress  EYES: Eyes grossly normal to inspection, PERRL and conjunctivae and sclerae normal  HENT: ear canals and TM's normal, nose and mouth without ulcers or lesions  NECK: no adenopathy, no asymmetry, masses, or scars and thyroid normal to palpation  RESP: lungs clear to auscultation - no rales, rhonchi or wheezes  BREAST: normal without masses, tenderness or nipple discharge and no palpable axillary masses or adenopathy  CV: regular rate and rhythm, normal S1 S2, no S3 or S4, no murmur, click or rub, no peripheral edema and peripheral pulses strong  ABDOMEN: soft, nontender, no hepatosplenomegaly, no masses and bowel sounds normal  MS: no gross " musculoskeletal defects noted, no edema  SKIN: no suspicious lesions or rashes  NEURO: Normal strength and tone, mentation intact and speech normal  PSYCH: mentation appears normal, affect normal/bright    Diagnostic Test Results:  Labs reviewed in Epic  No results found for any visits on 11/26/19.    ASSESSMENT/PLAN:       ICD-10-CM    1. Routine general medical examination at a health care facility Z00.00 Glucose   2. Norovirus A08.11    3. Dyspareunia in female N94.10    4. Partner relationship problems Z63.0 MENTAL HEALTH REFERRAL  - Adult; Outpatient Treatment; Individual/Couples/Family/Group Therapy/Health Psychology; G: [facility] [phone]; We will contact you to schedule the appointment or please call with any questions   5. Microhematuria R31.29 CANCELED: *UA reflex to Microscopic and Culture ([place] [facility] [place] [facility] (except [place] and [place])   6. CARDIOVASCULAR SCREENING; LDL GOAL LESS THAN 160 Z13.6 Lipid panel reflex to direct LDL Fasting     CPE- Discussed diet, calcium and exercise.  Thin prep pap was not done.  Eye and dental care UTD or recommended f/u.  No immunizations needed today.  See orders below for tests ordered and screening needed.   She is not fasting today- will rtc for fasting labs.    Norovirus- sx's mostly resolved, but still in final stages of recovery, residual fatigue.    Relationship concerns-   For the relationship concerns, she's had a referral for therapy in the past, but hasn't acted on it.  Now thinks she'd be interested in individual counseling- needs to figure out where she's at in all of it before couples.  New referral placed.    Dyspareunia, she has the PT exercises/tools- feels like she can do them on her own at home, doesn't want a new PT referral at least at this point.    H/o microhematuria- will check ua in future, did not leave urine today.  Similar issue with sister and mother, with negative w/u for them.  Discussed if positive  "for her again, should likely see Dr. Cabrera, and can decide together if she should also have a w/u based on her fam h/o.      COUNSELING:  Reviewed preventive health counseling, as reflected in patient instructions    Estimated body mass index is 21.12 kg/m  as calculated from the following:    Height as of this encounter: 1.613 m (5' 3.5\").    Weight as of this encounter: 54.9 kg (121 lb 2 oz).         reports that she has never smoked. She has never used smokeless tobacco.      Counseling Resources:  ATP IV Guidelines  Pooled Cohorts Equation Calculator  Breast Cancer Risk Calculator  FRAX Risk Assessment  ICSI Preventive Guidelines  Dietary Guidelines for Americans, 2010  USDA's MyPlate  ASA Prophylaxis  Lung CA Screening    Nia Chavez MD  [facility]  "

## 2019-12-10 DIAGNOSIS — Z13.6 CARDIOVASCULAR SCREENING; LDL GOAL LESS THAN 160: ICD-10-CM

## 2019-12-10 DIAGNOSIS — R31.29 MICROHEMATURIA: ICD-10-CM

## 2019-12-10 DIAGNOSIS — R31.29 MICROHEMATURIA: Primary | ICD-10-CM

## 2019-12-10 DIAGNOSIS — Z00.00 ROUTINE GENERAL MEDICAL EXAMINATION AT A HEALTH CARE FACILITY: ICD-10-CM

## 2019-12-10 LAB
ALBUMIN UR-MCNC: NEGATIVE MG/DL
APPEARANCE UR: CLEAR
BILIRUB UR QL STRIP: NEGATIVE
CHOLEST SERPL-MCNC: 200 MG/DL
COLOR UR AUTO: YELLOW
GLUCOSE SERPL-MCNC: 98 MG/DL (ref 70–99)
GLUCOSE UR STRIP-MCNC: NEGATIVE MG/DL
HDLC SERPL-MCNC: 66 MG/DL
HGB UR QL STRIP: ABNORMAL
KETONES UR STRIP-MCNC: NEGATIVE MG/DL
LDLC SERPL CALC-MCNC: 117 MG/DL
LEUKOCYTE ESTERASE UR QL STRIP: ABNORMAL
NITRATE UR QL: NEGATIVE
NONHDLC SERPL-MCNC: 134 MG/DL
PH UR STRIP: 6 PH (ref 5–7)
RBC #/AREA URNS AUTO: ABNORMAL /HPF
SOURCE: ABNORMAL
SP GR UR STRIP: 1.01 (ref 1–1.03)
TRIGL SERPL-MCNC: 83 MG/DL
UROBILINOGEN UR STRIP-ACNC: 0.2 EU/DL (ref 0.2–1)
WBC #/AREA URNS AUTO: ABNORMAL /HPF

## 2019-12-10 PROCEDURE — 36415 COLL VENOUS BLD VENIPUNCTURE: CPT | Performed by: FAMILY MEDICINE

## 2019-12-10 PROCEDURE — 82947 ASSAY GLUCOSE BLOOD QUANT: CPT | Performed by: FAMILY MEDICINE

## 2019-12-10 PROCEDURE — 81001 URINALYSIS AUTO W/SCOPE: CPT | Performed by: FAMILY MEDICINE

## 2019-12-10 PROCEDURE — 80061 LIPID PANEL: CPT | Performed by: FAMILY MEDICINE

## 2019-12-12 ENCOUNTER — TELEPHONE (OUTPATIENT)
Dept: FAMILY MEDICINE | Facility: CLINIC | Age: 49
End: 2019-12-12

## 2019-12-12 DIAGNOSIS — R31.29 MICROHEMATURIA: Primary | ICD-10-CM

## 2019-12-12 NOTE — TELEPHONE ENCOUNTER
Called and discussed labs.  GLucose back in normal range (high-nl), LDL still good, but higher than last time, when it was in a great range.  HDL/trigs stable.  She's been exercising less, and will think about if her diet is much different than in 7/18.  UA- still has 5-10 RBCs.  Strong fam h/o this- sister, mother and MGM.  No known cause found.  Mother had extensive w/u in hospital, sister had further testing through PCP, but not urology.  Pt is interested in meeting with urology to see if further w/u is needed, and f/u in future.  Referral placed.  CW

## 2019-12-12 NOTE — TELEPHONE ENCOUNTER
Reason for call:  Results   Name of test or procedure: urine and blood tests  Date of test or procedure: 12/10/2019  Location of test or procedure: [place]    Additional comments:     Phone number to reach patient:  Cell number on file:    Telephone Information:   Mobile [phone]       Best Time:      Can we leave a detailed message on this number?  YES

## 2019-12-12 NOTE — RESULT ENCOUNTER NOTE
Called pt and discussed labs.  GLucose back in normal range (high-nl), LDL still good, but higher than last time, when it was in a great range.  HDL/trigs stable.  She's been exercising less, and will think about if her diet is much different than in 7/18.  UA- still has 5-10 RBCs.  Strong fam h/o this- sister, mother and MGM.  No known cause found.  Mother had extensive w/u in hospital, sister had further testing through PCP, but not urology.  Pt is interested in meeting with urology to see if further w/u is needed, and f/u in future.  Referral placed- see TE encounter.  CW

## 2020-02-11 ENCOUNTER — HOSPITAL ENCOUNTER (OUTPATIENT)
Dept: MAMMOGRAPHY | Facility: CLINIC | Age: 50
Discharge: HOME | End: 2020-02-11
Attending: FAMILY MEDICINE | Admitting: FAMILY MEDICINE

## 2020-02-11 DIAGNOSIS — Z12.31 VISIT FOR SCREENING MAMMOGRAM: ICD-10-CM

## 2020-02-11 PROCEDURE — 77067 SCR MAMMO BI INCL CAD: CPT

## 2020-05-08 ENCOUNTER — TELEPHONE (OUTPATIENT)
Dept: FAMILY MEDICINE | Facility: CLINIC | Age: 50
End: 2020-05-08

## 2020-05-08 NOTE — TELEPHONE ENCOUNTER
Summary:    Patient is due/failing the following:   Updated flu shot    Type of outreach:    Action needed:     If need for provider review:    Please indicate OV, lab, MTM, or nurse appt if needed.  Indicate fasting or not fasting.                                                                                                                                          Joaquin Hou ma

## 2020-11-08 ENCOUNTER — HEALTH MAINTENANCE LETTER (OUTPATIENT)
Age: 50
End: 2020-11-08

## 2021-01-15 ENCOUNTER — HEALTH MAINTENANCE LETTER (OUTPATIENT)
Age: 51
End: 2021-01-15

## 2021-02-15 ENCOUNTER — HOSPITAL ENCOUNTER (OUTPATIENT)
Dept: MAMMOGRAPHY | Facility: CLINIC | Age: 51
Discharge: HOME | End: 2021-02-15
Attending: FAMILY MEDICINE | Admitting: FAMILY MEDICINE
Payer: COMMERCIAL

## 2021-02-15 DIAGNOSIS — Z12.31 VISIT FOR SCREENING MAMMOGRAM: ICD-10-CM

## 2021-02-15 PROCEDURE — 77063 BREAST TOMOSYNTHESIS BI: CPT

## 2021-02-17 ENCOUNTER — OFFICE VISIT (OUTPATIENT)
Dept: FAMILY MEDICINE | Facility: CLINIC | Age: 51
End: 2021-02-17
Payer: COMMERCIAL

## 2021-02-17 VITALS
DIASTOLIC BLOOD PRESSURE: 77 MMHG | HEIGHT: 64 IN | HEART RATE: 95 BPM | BODY MASS INDEX: 21.07 KG/M2 | OXYGEN SATURATION: 98 % | TEMPERATURE: 98.4 F | SYSTOLIC BLOOD PRESSURE: 124 MMHG | WEIGHT: 123.4 LBS | RESPIRATION RATE: 16 BRPM

## 2021-02-17 DIAGNOSIS — E55.9 VITAMIN D DEFICIENCY: ICD-10-CM

## 2021-02-17 DIAGNOSIS — Z86.39 HISTORY OF ELEVATED GLUCOSE: ICD-10-CM

## 2021-02-17 DIAGNOSIS — Z11.3 SCREEN FOR STD (SEXUALLY TRANSMITTED DISEASE): ICD-10-CM

## 2021-02-17 DIAGNOSIS — R31.29 MICROHEMATURIA: ICD-10-CM

## 2021-02-17 DIAGNOSIS — Z00.00 ROUTINE GENERAL MEDICAL EXAMINATION AT A HEALTH CARE FACILITY: Primary | ICD-10-CM

## 2021-02-17 DIAGNOSIS — Z13.6 CARDIOVASCULAR SCREENING; LDL GOAL LESS THAN 160: ICD-10-CM

## 2021-02-17 DIAGNOSIS — N94.10 DYSPAREUNIA IN FEMALE: ICD-10-CM

## 2021-02-17 DIAGNOSIS — Z23 FLU VACCINE NEED: ICD-10-CM

## 2021-02-17 LAB
ALBUMIN UR-MCNC: 30 MG/DL
ANION GAP SERPL CALCULATED.3IONS-SCNC: 7 MMOL/L (ref 3–14)
APPEARANCE UR: CLEAR
BACTERIA #/AREA URNS HPF: ABNORMAL /HPF
BILIRUB UR QL STRIP: NEGATIVE
BUN SERPL-MCNC: 15 MG/DL (ref 7–30)
CALCIUM SERPL-MCNC: 9.6 MG/DL (ref 8.5–10.1)
CHLORIDE SERPL-SCNC: 106 MMOL/L (ref 94–109)
CHOLEST SERPL-MCNC: 205 MG/DL
CO2 SERPL-SCNC: 26 MMOL/L (ref 20–32)
COLOR UR AUTO: YELLOW
CREAT SERPL-MCNC: 0.77 MG/DL (ref 0.52–1.04)
DEPRECATED CALCIDIOL+CALCIFEROL SERPL-MC: 48 UG/L (ref 20–75)
GFR SERPL CREATININE-BSD FRML MDRD: >90 ML/MIN/{1.73_M2}
GLUCOSE SERPL-MCNC: 101 MG/DL (ref 70–99)
GLUCOSE UR STRIP-MCNC: NEGATIVE MG/DL
HBA1C MFR BLD: 5.4 % (ref 0–5.6)
HCV AB SERPL QL IA: NONREACTIVE
HDLC SERPL-MCNC: 73 MG/DL
HGB UR QL STRIP: ABNORMAL
HIV 1+2 AB+HIV1 P24 AG SERPL QL IA: NONREACTIVE
KETONES UR STRIP-MCNC: NEGATIVE MG/DL
LDLC SERPL CALC-MCNC: 114 MG/DL
LEUKOCYTE ESTERASE UR QL STRIP: ABNORMAL
NITRATE UR QL: NEGATIVE
NON-SQ EPI CELLS #/AREA URNS LPF: ABNORMAL /LPF
NONHDLC SERPL-MCNC: 132 MG/DL
PH UR STRIP: 5.5 PH (ref 5–7)
POTASSIUM SERPL-SCNC: 4 MMOL/L (ref 3.4–5.3)
RBC #/AREA URNS AUTO: ABNORMAL /HPF
SODIUM SERPL-SCNC: 139 MMOL/L (ref 133–144)
SOURCE: ABNORMAL
SP GR UR STRIP: 1.02 (ref 1–1.03)
T PALLIDUM AB SER QL: NONREACTIVE
TRIGL SERPL-MCNC: 88 MG/DL
UROBILINOGEN UR STRIP-ACNC: 0.2 EU/DL (ref 0.2–1)
WBC #/AREA URNS AUTO: ABNORMAL /HPF

## 2021-02-17 PROCEDURE — 87591 N.GONORRHOEAE DNA AMP PROB: CPT | Performed by: FAMILY MEDICINE

## 2021-02-17 PROCEDURE — 90471 IMMUNIZATION ADMIN: CPT | Performed by: FAMILY MEDICINE

## 2021-02-17 PROCEDURE — 82306 VITAMIN D 25 HYDROXY: CPT | Performed by: FAMILY MEDICINE

## 2021-02-17 PROCEDURE — 99000 SPECIMEN HANDLING OFFICE-LAB: CPT | Performed by: FAMILY MEDICINE

## 2021-02-17 PROCEDURE — 36415 COLL VENOUS BLD VENIPUNCTURE: CPT | Performed by: FAMILY MEDICINE

## 2021-02-17 PROCEDURE — 80048 BASIC METABOLIC PNL TOTAL CA: CPT | Performed by: FAMILY MEDICINE

## 2021-02-17 PROCEDURE — 86803 HEPATITIS C AB TEST: CPT | Performed by: FAMILY MEDICINE

## 2021-02-17 PROCEDURE — 90686 IIV4 VACC NO PRSV 0.5 ML IM: CPT | Performed by: FAMILY MEDICINE

## 2021-02-17 PROCEDURE — 81001 URINALYSIS AUTO W/SCOPE: CPT | Performed by: FAMILY MEDICINE

## 2021-02-17 PROCEDURE — 99396 PREV VISIT EST AGE 40-64: CPT | Mod: 25 | Performed by: FAMILY MEDICINE

## 2021-02-17 PROCEDURE — 80061 LIPID PANEL: CPT | Performed by: FAMILY MEDICINE

## 2021-02-17 PROCEDURE — 87491 CHLMYD TRACH DNA AMP PROBE: CPT | Performed by: FAMILY MEDICINE

## 2021-02-17 PROCEDURE — 86780 TREPONEMA PALLIDUM: CPT | Mod: 90 | Performed by: FAMILY MEDICINE

## 2021-02-17 PROCEDURE — 87389 HIV-1 AG W/HIV-1&-2 AB AG IA: CPT | Performed by: FAMILY MEDICINE

## 2021-02-17 PROCEDURE — 83036 HEMOGLOBIN GLYCOSYLATED A1C: CPT | Performed by: FAMILY MEDICINE

## 2021-02-17 ASSESSMENT — MIFFLIN-ST. JEOR: SCORE: 1161.8

## 2021-02-17 NOTE — PROGRESS NOTES
SUBJECTIVE:   CC: Brianna Lew is an 50 year old woman who presents for preventive health visit.       Patient has been advised of split billing requirements and indicates understanding: Yes  Healthy Habits:     Getting at least 3 servings of Calcium per day:  Yes    Bi-annual eye exam:  NO    Dental care twice a year:  Yes    Sleep apnea or symptoms of sleep apnea:  None    Diet:  Regular (no restrictions)    Frequency of exercise:  2-3 days/week    Duration of exercise:  30-45 minutes    Taking medications regularly:  Yes    PHQ-2 Total Score: 0    Additional concerns today:  No    H/o microhematuria-  Sx's- occasionally has a little burn when she's urinating, but then fine the next time.    No sx's when she's not urinating.  Father has had blood in urine.  Mother was hospitalized for blood in urine in past- no known etiology ever found.  Sister and MGM also had blood in urine.    UTIs- had 1-2, or one that recurred.  While she was dating.    Mood/relationship/COVID updates/issues-  Pt notes that she did break up with her boyfriend in Nov, which was a good thing, though a bit lonely during COVID.  Now notes that she 'doesn't need any more of those therapy referrals'.  She was able to go up and stay at her [facility] for a couple months over the holidays which was nice.  Nice that virtual work makes it possible.    H/o dyspareunia/pelvic floor issues- saw ob/gyn, and PT.    Pt notes that the PT exercises did help, but seems like it seems like she needs to keep on top of it.  Feels like she needs to do the progressive dilator exercises regularly, or she loses ground.  She does note that things did get a lot better with increased intercourse at beginning of relationship which was reassuring.  Did also see a massage therapist (Rosas) and wonders if that would help, too.  Norovirus - in bed for longer period of time, lower back and hips seized up.  Saw Rosas massage therapist.  When he was  stretching her hip, he noticed her pelvic floor was really tight.  Wondered if he could help loosen things.  After COVID improves, would like to try that when situation improves.  She might be interested in estrogen trial in future, but not now, especially as she's not dating.    Now in menopause-   Periods- thinks she's done...  At least a year- maybe two yrs.    Vit D- usually 2000 units/day (occasionally 4000 units/day).  Will go to 2000 units/day in winter.  Vit D level was 24 in 9/12.    Lipids-   LDL did increase from 75 in 7/18, to 117 in 12/19.  Thinks diet is a bit better than last time, but exercise is less due to COVID.  Is fasting today.      Today's PHQ-2 Score:   PHQ-2 ( 1999 Pfizer) 2/17/2021   Q1: Little interest or pleasure in doing things 0   Q2: Feeling down, depressed or hopeless 0   PHQ-2 Score 0   Q1: Little interest or pleasure in doing things Not at all   Q2: Feeling down, depressed or hopeless Not at all   PHQ-2 Score 0       Abuse: Current or Past (Physical, Sexual or Emotional) - No  Do you feel safe in your environment? Yes    Have you ever done Advance Care Planning? (For example, a Health Directive, POLST, or a discussion with a medical provider or your loved ones about your wishes): No, advance care planning information given to patient to review.  Patient declined advance care planning discussion at this time.    Social History     Tobacco Use     Smoking status: Never Smoker     Smokeless tobacco: Never Used   Substance Use Topics     Alcohol use: Yes     Alcohol/week: 0.0 standard drinks     Comment: social, 1 drink or less per day     If you drink alcohol do you typically have >3 drinks per day or >7 drinks per week? No    Alcohol Use 2/17/2021   Prescreen: >3 drinks/day or >7 drinks/week? No   Prescreen: >3 drinks/day or >7 drinks/week? -   No flowsheet data found.    Any new diagnosis of family breast, ovarian, or bowel cancer? No     Reviewed orders with patient.  Reviewed  health maintenance and updated orders accordingly - Yes    Lab work is in process  Labs reviewed in EPIC  BP Readings from Last 3 Encounters:   21 124/77   19 118/68   19 136/73    Wt Readings from Last 3 Encounters:   21 56 kg (123 lb 6.4 oz)   19 54.9 kg (121 lb 2 oz)   19 54.5 kg (120 lb 3.2 oz)                  Patient Active Problem List   Diagnosis     Primary localized osteoarthrosis, hand     CARDIOVASCULAR SCREENING; LDL GOAL LESS THAN 160     Female infertility of other specified origin     Microhematuria     Dyspareunia in female     Partner relationship problems     Past Surgical History:   Procedure Laterality Date     HC TOOTH EXTRACTION W/FORCEP      wisdom teeth       Social History     Tobacco Use     Smoking status: Never Smoker     Smokeless tobacco: Never Used   Substance Use Topics     Alcohol use: Yes     Alcohol/week: 0.0 standard drinks     Comment: social, 1 drink or less per day     Family History   Problem Relation Age of Onset     Hypertension Father      Cerebrovascular Disease Maternal Grandmother 82     Cancer Maternal Grandmother         skin     Heart Disease Maternal Grandmother         CHF,  at 88     Cerebrovascular Disease Maternal Grandfather         passed away     Hypertension Paternal Grandmother      Hypertension Paternal Grandfather      Diabetes Paternal Grandfather      C.A.D. Paternal Grandfather 62        MI in early 60s     Cancer Paternal Uncle 54        pancreatic cancer- passed away at 54     Breast Cancer Maternal Aunt 66     Cancer Paternal Aunt         tongue ca, smoker/etoh     Breast Cancer Maternal Cousin 35        mastectomy, chemo planned         Current Outpatient Medications   Medication Sig Dispense Refill     NO ACTIVE MEDICATIONS        Allergies   Allergen Reactions     Sulfa Drugs Hives and Swelling     Facial swelling, hives, heart racing.     Recent Labs   Lab Test 21  0845 12/10/19  0750 19  0832  "07/06/18  1045   A1C 5.4  --   --   --    * 117*  --  75   HDL 73 66  --  69   TRIG 88 83  --  73   ALT  --   --  36  --    CR 0.77  --  0.72  --    GFRESTIMATED >90  --  >90  --    GFRESTBLACK >90  --  >90  --    POTASSIUM 4.0  --  3.4  --         Breast CA Risk Screening:  Breast CA Risk Assessment (FHS-7) 2/17/2021   Did any of your first-degree relatives have breast or ovarian cancer? No   Did any of your relatives have bilateral breast cancer? No   Did any man in your family have breast cancer? No     Mammogram Screening: Recommended annual mammography  Pertinent mammograms are reviewed under the imaging tab.    History of abnormal Pap smear: NO - age 30-65 PAP every 5 years with negative HPV co-testing recommended  PAP / HPV Latest Ref Rng & Units 7/6/2018 12/2/2014 2/24/2012   PAP - NIL NIL NIL   HPV 16 DNA NEG:Negative Negative - -   HPV 18 DNA NEG:Negative Negative - -   OTHER HR HPV NEG:Negative Negative - -     Reviewed and updated as needed this visit by clinical staff  Tobacco  Allergies  Meds  Problems  Med Hx  Surg Hx  Fam Hx          Reviewed and updated as needed this visit by Provider                    Review of Systems  10 point ROS of systems including Constitutional, Eyes, Respiratory, Cardiovascular, Gastroenterology, Genitourinary, Integumentary, Muscularskeletal, Psychiatric were all negative except for pertinent positives noted in my HPI.       OBJECTIVE:   /77   Pulse 95   Temp 98.4  F (36.9  C) (Tympanic)   Resp 16   Ht 1.613 m (5' 3.5\")   Wt 56 kg (123 lb 6.4 oz)   SpO2 98%   BMI 21.52 kg/m    Physical Exam  GENERAL: healthy, alert and no distress  EYES: Eyes grossly normal to inspection, PERRL and conjunctivae and sclerae normal  HENT: ear canals and TM's normal, nose and mouth without ulcers or lesions  NECK: no adenopathy, no asymmetry, masses, or scars and thyroid normal to palpation  RESP: lungs clear to auscultation - no rales, rhonchi or wheezes  BREAST: " normal without masses, tenderness or nipple discharge and no palpable axillary masses or adenopathy  CV: regular rate and rhythm, normal S1 S2, no S3 or S4, no murmur, click or rub, no peripheral edema and peripheral pulses strong  ABDOMEN: soft, nontender, no hepatosplenomegaly, no masses and bowel sounds normal  MS: no gross musculoskeletal defects noted, no edema  SKIN: no suspicious lesions or rashes  NEURO: Normal strength and tone, mentation intact and speech normal  PSYCH: mentation appears normal, affect normal/bright    Diagnostic Test Results:  Labs reviewed in Epic    ASSESSMENT/PLAN:       ICD-10-CM    1. Routine general medical examination at a health care facility  Z00.00 **Hepatitis C Screen Reflex to RNA FUTURE anytime   2. Microhematuria  R31.29    3. Dyspareunia in female  N94.10 UROLOGY ADULT REFERRAL     UA with Microscopic reflex to Culture   4. Flu vaccine need  Z23 INFLUENZA VACCINE IM > 6 MONTHS VALENT IIV4 [04285]   5. Screen for STD (sexually transmitted disease)  Z11.3 NEISSERIA GONORRHOEA PCR     CHLAMYDIA TRACHOMATIS PCR     HIV Antigen Antibody Combo     Treponema Abs w Reflex to RPR and Titer   6. History of elevated glucose  Z86.39 Basic metabolic panel  (Ca, Cl, CO2, Creat, Gluc, K, Na, BUN)     Hemoglobin A1c   7. CARDIOVASCULAR SCREENING; LDL GOAL LESS THAN 160  Z13.6 Lipid panel reflex to direct LDL Fasting   8. Vitamin D deficiency  E55.9 Vitamin D Deficiency     CPE- Discussed diet, calcium and exercise.  Thin prep pap was not done.  Eye and dental care UTD or recommended f/u.  Flu vaccine immunizations needed today.  See orders below for tests ordered and screening needed.  Will check fasting lipids, glucose and A1C (h/o elevated fasting glucose), STD screen, Vit D and UA recheck.  Will add Hep C screening test as well.    Microhematuria- pt with strong fam h/o microhematuria (multiple on mother's side, sister and her father).  Pt with RBC's on UA's (some with UTI sx's, couple  "when she wasn't having sx's- still with RBCs).  UA today with 10-25 RBCs.   Referred to urology in past, but didn't go.  Will refer again.    Sayrima- Seen by Ob/gyn and sent to PT, which was helpful, but a lot of work to keep things up.  Considering seeing her long-term massage therapist who mentioned she likely had pelvic floor issues- may be able to help.  Also discussed potential trial of estrogen topical txts - pt would like to wait on this as she's not currently sexually active.    Social- doing better overall after breaking up with boyfriend in 11/20.  No UTI's since that time.  Will do STD screen.        Patient has been advised of split billing requirements and indicates understanding: Yes  COUNSELING:  Reviewed preventive health counseling, as reflected in patient instructions    Estimated body mass index is 21.52 kg/m  as calculated from the following:    Height as of this encounter: 1.613 m (5' 3.5\").    Weight as of this encounter: 56 kg (123 lb 6.4 oz).        She reports that she has never smoked. She has never used smokeless tobacco.      Counseling Resources:  ATP IV Guidelines  Pooled Cohorts Equation Calculator  Breast Cancer Risk Calculator  BRCA-Related Cancer Risk Assessment: FHS-7 Tool  FRAX Risk Assessment  ICSI Preventive Guidelines  Dietary Guidelines for Americans, 2010  USDA's MyPlate  ASA Prophylaxis  Lung CA Screening    Nia Chavez MD  [facility]  "

## 2021-02-17 NOTE — NURSING NOTE
Prior to immunization administration, verified patients identity using patient s name and date of birth. Please see Immunization Activity for additional information.     Screening Questionnaire for Adult Immunization    Are you sick today?   No   Do you have allergies to medications, food, a vaccine component or latex?   No   Have you ever had a serious reaction after receiving a vaccination?   No   Do you have a long-term health problem with heart, lung, kidney, or metabolic disease (e.g., diabetes), asthma, a blood disorder, no spleen, complement component deficiency, a cochlear implant, or a spinal fluid leak?  Are you on long-term aspirin therapy?   No   Do you have cancer, leukemia, HIV/AIDS, or any other immune system problem?   No   Do you have a parent, brother, or sister with an immune system problem?   No   In the past 3 months, have you taken medications that affect  your immune system, such as prednisone, other steroids, or anticancer drugs; drugs for the treatment of rheumatoid arthritis, Crohn s disease, or psoriasis; or have you had radiation treatments?   No   Have you had a seizure, or a brain or other nervous system problem?   No   During the past year, have you received a transfusion of blood or blood    products, or been given immune (gamma) globulin or antiviral drug?   No   For women: Are you pregnant or is there a chance you could become       pregnant during the next month?   No   Have you received any vaccinations in the past 4 weeks?   No     Immunization questionnaire answers were all negative.        Per orders of Dr. Chavez, injection of Fluzone given by Deepa Hudson MA. Patient instructed to remain in clinic for 15 minutes afterwards, and to report any adverse reaction to me immediately.       Screening performed by Deepa Hudson MA on 2/17/2021 at 8:44 AM.  Deepa Hudson MA on 2/17/2021 at 8:44 AM

## 2021-02-18 LAB
C TRACH DNA SPEC QL NAA+PROBE: NEGATIVE
N GONORRHOEA DNA SPEC QL NAA+PROBE: NEGATIVE
SPECIMEN SOURCE: NORMAL
SPECIMEN SOURCE: NORMAL

## 2021-02-18 NOTE — RESULT ENCOUNTER NOTE
Here are your lab results from your recent visit...  -Your STD labs (gonorrhea, chlamydia, HIV and syphilis) were all negative.  -Your hepatitis C screening test was also negative.  -Your Vitamin D level is in a good range, so I would keep your supplementation at 2000 units/day during the winter (and lower during the summer).  -Your basic metabolic panel (which includes electrolyte levels, blood sugar level and kidney function tests) looks good other than that just slightly elevated fasting glucose at 101, but your hemoglobin A1C (the three month average of your glucose levels) looks good/normal at 5.4 (pre-diabetic range is from 5.7-6.4).  -Your cholesterol panel looks good with a fairly low LDL (the bad cholesterol) and a high HDL (the good cholesterol).   -Your urine test shows blood/RBC's again, so I'm glad we're getting you to see urology.    Please let me know if you have any questions.  Best,   Gulshan Chavez MD

## 2021-02-25 ENCOUNTER — PRE VISIT (OUTPATIENT)
Dept: UROLOGY | Facility: CLINIC | Age: 51
End: 2021-02-25

## 2021-02-25 NOTE — TELEPHONE ENCOUNTER
Reason for Visit: Consult    Diagnosis: hematuria    Orders/Procedures/Records: in system    Contact Patient: n/a    Rooming Requirements: normal      Dolly Hsu LPN  02/25/21  10:19 AM

## 2021-03-03 ENCOUNTER — VIRTUAL VISIT (OUTPATIENT)
Dept: UROLOGY | Facility: CLINIC | Age: 51
End: 2021-03-03
Payer: COMMERCIAL

## 2021-03-03 DIAGNOSIS — R31.29 MICROHEMATURIA: Primary | ICD-10-CM

## 2021-03-03 DIAGNOSIS — N95.2 ATROPHIC VAGINITIS: ICD-10-CM

## 2021-03-03 DIAGNOSIS — N94.10 DYSPAREUNIA IN FEMALE: ICD-10-CM

## 2021-03-03 PROCEDURE — 99204 OFFICE O/P NEW MOD 45 MIN: CPT | Mod: 95 | Performed by: UROLOGY

## 2021-03-03 NOTE — PROGRESS NOTES
HPI:  Brianna Lew is a 50 year old female being seen for microhematuria for over a year.  She denies any symptoms or pain.  She denies any urgency or frequency or incontinence unless she sneezes very hard.      Exam:  There were no vitals taken for this visit.  GENERAL: Healthy, alert and no distress  EYES: Eyes grossly normal to inspection.  No discharge or erythema, or obvious scleral/conjunctival abnormalities.  RESP: No audible wheeze, cough, or visible cyanosis.  No visible retractions or increased work of breathing.    SKIN: Visible skin clear. No significant rash, abnormal pigmentation or lesions.  NEURO: Cranial nerves grossly intact.  Mentation and speech appropriate for age.  PSYCH: Mentation appears normal, affect normal/bright, judgement and insight intact, normal speech and appearance well-groomed.    Review of Labs:  The following labs were reviewed by me and discussed with the patient:  2/17/21  UA with blood and protein  BMP Glucose 101  Cr. 0.77  Hgb A1C 5.4   Treponema neg  Chlamydia neg  Neisseria neg    12/19  UA with moderate blood    Assessment & Plan   51 y/o female with asymptomatic microhematuria.  She denies a hx of smoking.  We discussed a full w/u and she would like to proceed with that.  Regarding her dyspareunia she is working with a PT and has been doing Yoga which helped.  We also discussed vaginal valium as an option as well.  We also discussed vaginal estrogen replacement, she would like to try the cream.  -urine for cytology  -Ct urogram  -she will f/u with physical and massage therapist  -Rx for estrogen cream.  -pt. Will let me know if she would like to try vaginal valium  -f/u to review and undergo cystoscopy.    Estee Cabrera MD  [facility] UROLOGY CLINIC [place]      ==========================    Additional Billing and Coding Information:    45 minutes spent on the date of the encounter doing chart review, history and exam, documentation and further  activities as noted above    ==========================

## 2021-03-03 NOTE — LETTER
3/3/2021       RE: Brianna Lew  [address]  [place] [facility] [address]     Dear Colleague,    Thank you for referring your patient, Brianna Lew, to the [facility] UROLOGY [facility] [place] [facility]. Please see a copy of my visit note below.    Video Visit Technology for this patient: [facility] Video Visit- Patient was left in waiting room    Brianna is a 50 year old who is being evaluated via a billable video visit.      How would you like to obtain your AVS? MyChart  If the video visit is dropped, the invitation should be resent by: Send to e-mail at: [email or web address]  Will anyone else be joining your video visit? No      Video Start Time: 9:02 AM  Video-Visit Details    Type of service:  Video Visit    Video End Time:9:26 AM    Originating Location (pt. Location): Home    Distant Location (provider location):  [facility] UROLOGY [facility]     Platform used for Video Visit: [employer]          HPI:  Brianna Lew is a 50 year old female being seen for microhematuria for over a year.  She denies any symptoms or pain.  She denies any urgency or frequency or incontinence unless she sneezes very hard.      Exam:  There were no vitals taken for this visit.  GENERAL: Healthy, alert and no distress  EYES: Eyes grossly normal to inspection.  No discharge or erythema, or obvious scleral/conjunctival abnormalities.  RESP: No audible wheeze, cough, or visible cyanosis.  No visible retractions or increased work of breathing.    SKIN: Visible skin clear. No significant rash, abnormal pigmentation or lesions.  NEURO: Cranial nerves grossly intact.  Mentation and speech appropriate for age.  PSYCH: Mentation appears normal, affect normal/bright, judgement and insight intact, normal speech and appearance well-groomed.    Review of Labs:  The following labs were reviewed by me and discussed with the patient:  2/17/21  UA with blood  and protein  BMP Glucose 101  Cr. 0.77  Hgb A1C 5.4   Treponema neg  Chlamydia neg  Neisseria neg    12/19  UA with moderate blood    Assessment & Plan   49 y/o female with asymptomatic microhematuria.  She denies a hx of smoking.  We discussed a full w/u and she would like to proceed with that.  Regarding her dyspareunia she is working with a PT and has been doing Yoga which helped.  We also discussed vaginal valium as an option as well.  We also discussed vaginal estrogen replacement, she would like to try the cream.  -urine for cytology  -Ct urogram  -she will f/u with physical and massage therapist  -Rx for estrogen cream.  -pt. Will let me know if she would like to try vaginal valium  -f/u to review and undergo cystoscopy.    Estee Cabrera MD  [facility] UROLOGY CLINIC [facility]      ==========================    Additional Billing and Coding Information:    45 minutes spent on the date of the encounter doing chart review, history and exam, documentation and further activities as noted above    ==========================

## 2021-03-03 NOTE — PATIENT INSTRUCTIONS
Schedule:  -urine for cytology lab  -CT urogram  -Cystoscopy with Dr. Cabrera after these are completed    -Rx for estrogen cream.  -she will f/u with physical and massage therapist  -pt. Will let me know if she would like to try vaginal valium        It was a pleasure meeting with you today.  Thank you for allowing me and my team the privilege of caring for you today.  YOU are the reason we are here, and I truly hope we provided you with the excellent service you deserve.  Please let us know if there is anything else we can do for you so that we can be sure you are leaving completely satisfied with your care experience.

## 2021-03-03 NOTE — NURSING NOTE
Chief Complaint   Patient presents with     Consult     hematuria, dyspareunia       Patient Active Problem List   Diagnosis     Primary localized osteoarthrosis, hand     CARDIOVASCULAR SCREENING; LDL GOAL LESS THAN 160     Female infertility of other specified origin     Microhematuria     Dyspareunia in female     Partner relationship problems       Allergies   Allergen Reactions     Sulfa Drugs Hives and Swelling     Facial swelling, hives, heart racing.       Current Outpatient Medications   Medication Sig Dispense Refill     NO ACTIVE MEDICATIONS          Social History     Tobacco Use     Smoking status: Never Smoker     Smokeless tobacco: Never Used   Substance Use Topics     Alcohol use: Yes     Alcohol/week: 0.0 standard drinks     Comment: social, 1 drink or less per day     Drug use: No       Dolly Hsu LPN  3/3/2021  8:36 AM

## 2021-03-03 NOTE — PROGRESS NOTES
Video Visit Technology for this patient: [employer] Video Visit- Patient was left in waiting room    Brianna is a 50 year old who is being evaluated via a billable video visit.      How would you like to obtain your AVS? MyChart  If the video visit is dropped, the invitation should be resent by: Send to e-mail at: [email or web address]  Will anyone else be joining your video visit? No      Video Start Time: 9:02 AM  Video-Visit Details    Type of service:  Video Visit    Video End Time:9:26 AM    Originating Location (pt. Location): Home    Distant Location (provider location):  [facility] UROLOGY [facility]     Platform used for Video Visit: [employer]

## 2021-03-09 ENCOUNTER — ANCILLARY PROCEDURE (OUTPATIENT)
Dept: CT IMAGING | Facility: CLINIC | Age: 51
End: 2021-03-09
Attending: UROLOGY
Payer: COMMERCIAL

## 2021-03-09 DIAGNOSIS — R31.29 MICROHEMATURIA: ICD-10-CM

## 2021-03-09 PROCEDURE — 88112 CYTOPATH CELL ENHANCE TECH: CPT | Performed by: PATHOLOGY

## 2021-03-09 PROCEDURE — 74178 CT ABD&PLV WO CNTR FLWD CNTR: CPT | Mod: GC | Performed by: RADIOLOGY

## 2021-03-09 RX ORDER — IOPAMIDOL 755 MG/ML
76 INJECTION, SOLUTION INTRAVASCULAR ONCE
Status: COMPLETED | OUTPATIENT
Start: 2021-03-09 | End: 2021-03-09

## 2021-03-09 RX ADMIN — IOPAMIDOL 76 ML: 755 INJECTION, SOLUTION INTRAVASCULAR at 07:54

## 2021-03-10 LAB — COPATH REPORT: NORMAL

## 2021-06-09 ENCOUNTER — OFFICE VISIT (OUTPATIENT)
Dept: UROLOGY | Facility: CLINIC | Age: 51
End: 2021-06-09
Payer: COMMERCIAL

## 2021-06-09 ENCOUNTER — PRE VISIT (OUTPATIENT)
Dept: UROLOGY | Facility: CLINIC | Age: 51
End: 2021-06-09

## 2021-06-09 VITALS
BODY MASS INDEX: 21 KG/M2 | DIASTOLIC BLOOD PRESSURE: 76 MMHG | HEART RATE: 94 BPM | WEIGHT: 123 LBS | SYSTOLIC BLOOD PRESSURE: 111 MMHG | HEIGHT: 64 IN

## 2021-06-09 DIAGNOSIS — R31.29 MICROHEMATURIA: Primary | ICD-10-CM

## 2021-06-09 DIAGNOSIS — N94.10 DYSPAREUNIA, FEMALE: ICD-10-CM

## 2021-06-09 LAB
ALBUMIN UR-MCNC: NEGATIVE MG/DL
APPEARANCE UR: CLEAR
BILIRUB UR QL STRIP: NEGATIVE
COLOR UR AUTO: YELLOW
GLUCOSE UR STRIP-MCNC: NEGATIVE MG/DL
HGB UR QL STRIP: ABNORMAL
KETONES UR STRIP-MCNC: NEGATIVE MG/DL
LEUKOCYTE ESTERASE UR QL STRIP: NEGATIVE
NITRATE UR QL: NEGATIVE
PH UR STRIP: 5.5 PH (ref 5–7)
SP GR UR STRIP: <1.005 (ref 1–1.03)
UROBILINOGEN UR STRIP-ACNC: 0.2 EU/DL (ref 0.2–1)

## 2021-06-09 PROCEDURE — 52000 CYSTOURETHROSCOPY: CPT | Performed by: UROLOGY

## 2021-06-09 RX ORDER — LIDOCAINE HYDROCHLORIDE 20 MG/ML
JELLY TOPICAL ONCE
Status: COMPLETED | OUTPATIENT
Start: 2021-06-09 | End: 2021-06-09

## 2021-06-09 RX ADMIN — LIDOCAINE HYDROCHLORIDE: 20 JELLY TOPICAL at 08:54

## 2021-06-09 ASSESSMENT — PAIN SCALES - GENERAL: PAINLEVEL: NO PAIN (0)

## 2021-06-09 ASSESSMENT — MIFFLIN-ST. JEOR: SCORE: 1167.92

## 2021-06-09 NOTE — TELEPHONE ENCOUNTER
Reason for Visit: Cystoscopy    Diagnosis: Microhematuria     Orders/Procedures/Records: in system    Contact Patient: n/a    Rooming Requirements: UA dip prior to getting ready for cystoscopy. If positive for Leuks and/or Nitrites, will not do cystoscopy. If positive, send urine for official UA / UC.      Seymour Woodard  06/09/21  7:59 AM

## 2021-06-09 NOTE — PATIENT INSTRUCTIONS
"Referral was placed for Nephrology Adult Referral    AFTER YOUR CYSTOSCOPY        You have just completed a cystoscopy, or \"cysto\", which allowed your physician to learn more about your bladder (or to remove a stent placed after surgery). We suggest that you continue to avoid caffeine, fruit juice, and alcohol for the next 24 hours, however, you are encouraged to return to your normal activities.         A few things that are considered normal after your cystoscopy:     * Small amount of bleeding (or spotting) that clears within the next 24 hours     * Slight burning sensation with urination     * Sensation to of needing to avoid more frequently     * The feeling of \"air\" in your urine     * Mild discomfort that is relieved with Tylenol        Please contact our office promptly if you:     * Develop a fever above 101 degrees     * Are unable to urinate     * Develop bright red blood that does not stop     * Severe pain or swelling         Please contact our office with any concerns or questions @[email or web address].  "

## 2021-06-09 NOTE — LETTER
6/9/2021       RE: Brianna Lew  [address] [id number]     Dear Colleague,    Thank you for referring your patient, Brianna Lew, to the [facility] [place] [facility]. Please see a copy of my visit note below.    Reason for Visit:  Cystoscopy    Clinical Data: Ms. Brianna Lew is a 50 year old female with a hx of microhematuria and dyspareunia.     Urine cytology 3/8/21:  NEM    CT urogram reviewed today 3/9/21:  IMPRESSION: No evidence of urinary tract obstruction, stone or mass.   1.  Scattered ill-defined hyperdense foci are noted in the left kidney  in the region of the medulla. There are no features of papillary  necrosis. They are not as dense as calculi but may represent mild form  of medullary nephrocalcinosis although it is atypical to be  unilateral. Alternatively it could be concentrated urine salts in the  setting of dehydration.   2.  Benign hepatic hemangioma.    Cystoscopy procedure:  Pt. Was consented and placed in the lithotomy position.  She was cleaned and preparred in the usual fashion.  Lidocain gel was inserted into the urethra and given time to take effect.  A 16 fr flexible cystoscope was then inserted through the urethra and into the bladder.  The urethra was wnl.  The bladder was with 1+ trabeculation.  No tumors, diverticulae, or stones.  Bilateral u/o's were effluxing clear urine.  The cystoscope was then withdrawn.  The pt. Tolerated the procedure well.    A/P:  51 y/o female with asymptomatic microhematuria with negative w/u except fro some nephrocalcinosis.  Regarding her dyspareunia she is working with a PT and has been doing Yoga which helped.  We also discussed vaginal valium as an option as well.  We also discussed vaginal estrogen replacement which she hasn't started yet.  -she will f/u with physical and massage therapist  -start estrogen cream.   -pt. Will let me  know if she would like to try vaginal valium   -referral to nephrology for nephrocalcinosis  -may f/u with primary MD.  I would check her urine q 6 months for a couple of years and return to urology is hematuria is increasing or if every any gross hematuria.    Thank you for allowing me to participate in the care of  Ms. Brianna Lew and I will keep you updated on her progress.    Estee Cabrera MD

## 2021-06-09 NOTE — PROGRESS NOTES
Reason for Visit:  Cystoscopy    Clinical Data: Ms. Brianna Lew is a 50 year old female with a hx of microhematuria and dyspareunia.     Urine cytology 3/8/21:  NEM    CT urogram reviewed today 3/9/21:  IMPRESSION: No evidence of urinary tract obstruction, stone or mass.   1.  Scattered ill-defined hyperdense foci are noted in the left kidney  in the region of the medulla. There are no features of papillary  necrosis. They are not as dense as calculi but may represent mild form  of medullary nephrocalcinosis although it is atypical to be  unilateral. Alternatively it could be concentrated urine salts in the  setting of dehydration.   2.  Benign hepatic hemangioma.    Cystoscopy procedure:  Pt. Was consented and placed in the lithotomy position.  She was cleaned and preparred in the usual fashion.  Lidocain gel was inserted into the urethra and given time to take effect.  A 16 fr flexible cystoscope was then inserted through the urethra and into the bladder.  The urethra was wnl.  The bladder was with 1+ trabeculation.  No tumors, diverticulae, or stones.  Bilateral u/o's were effluxing clear urine.  The cystoscope was then withdrawn.  The pt. Tolerated the procedure well.    A/P:  51 y/o female with asymptomatic microhematuria with negative w/u except fro some nephrocalcinosis.  Regarding her dyspareunia she is working with a PT and has been doing Yoga which helped.  We also discussed vaginal valium as an option as well.  We also discussed vaginal estrogen replacement which she hasn't started yet.  -she will f/u with physical and massage therapist  -start estrogen cream.   -pt. Will let me know if she would like to try vaginal valium   -referral to nephrology for nephrocalcinosis  -may f/u with primary MD.  I would check her urine q 6 months for a couple of years and return to urology is hematuria is increasing or if every any gross hematuria.    Thank you for allowing me to participate in the care of  Ms.  Brianna Lew and I will keep you updated on her progress.    Estee Cabrera MD

## 2021-06-09 NOTE — NURSING NOTE
"Chief Complaint   Patient presents with     Cystoscopy     Microhematuria        Blood pressure 111/76, pulse 94, height 1.626 m (5' 4\"), weight 55.8 kg (123 lb), not currently breastfeeding. Body mass index is 21.11 kg/m .    Patient Active Problem List   Diagnosis     Primary localized osteoarthrosis, hand     CARDIOVASCULAR SCREENING; LDL GOAL LESS THAN 160     Female infertility of other specified origin     Microhematuria     Dyspareunia in female     Partner relationship problems     Atrophic vaginitis       Allergies   Allergen Reactions     Sulfa Drugs Hives and Swelling     Facial swelling, hives, heart racing.       Current Outpatient Medications   Medication Sig Dispense Refill     COMPOUNDED NON-CONTROLLED SUBSTANCE (CMPD RX) - PHARMACY TO MIX COMPOUNDED MEDICATION Estriol 1 mg/g in HRT base, apply small amount to finger and apply to inside vagina daily for 2 weeks then twice weekly 30 g 11     NO ACTIVE MEDICATIONS          Social History     Tobacco Use     Smoking status: Never Smoker     Smokeless tobacco: Never Used   Substance Use Topics     Alcohol use: Yes     Alcohol/week: 0.0 standard drinks     Comment: social, 1 drink or less per day     Drug use: No       Invasive Procedure Safety Checklist:    Procedure: Cystoscopy    Action: Complete sections and checkboxes as appropriate.    Pre-procedure:  1. Patient ID Verified with 2 identifiers (Bonnie and  or MRN) : YES    2. Procedure and site verified with patient/designee (when able) : YES    3. Accurate consent documentation in medical record : YES    4. H&P (or appropriate assessment) documented in medical record : N/A  H&P must be up to 30 days prior to procedure an updated within 24 hours of                 Procedure as applicable.     5. Relevant diagnostic and radiology test results appropriately labeled and displayed as applicable : YES    6. Blood products, implants, devices, and/or special equipment available for the procedure as " applicable : YES    7. Procedure site(s) marked with provider initials [Exclusions: none] : NO    8. Marking not required. Reason : Yes  Procedure does not require site marking    Time Out:     Time-Out performed immediately prior to starting procedure, including verbal and active participation of all team members addressing: YES    1. Correct patient identity.  2. Confirmed that the correct side and site are marked.  3. An accurate procedure to be done.  4. Agreement on the procedure to be done.  5. Correct patient position.  6. Relevant images and results are properly labeled and appropriately displayed.  7. The need to administer antibiotics or fluids for irrigation purposes during the procedure as applicable.  8. Safety precautions based on patient history or medication use.    During Procedure: Verification of correct person, site, and procedure occurs any time the responsibility for care of the patient is transferred to another member of the care team.    The following medication was given:     MEDICATION:  Lidocaine without epinephrine 2% jelly  ROUTE: urethral   SITE: urethral   DOSE: 10 mL  LOT #: [id number]  : [employer]  EXPIRATION DATE: 12/22  NDC#: [address]-6   Was there drug waste? No    Prior to med admin, verified patient identity using patient's name and date of birth.  Due to med administration, patient instructed to remain in clinic for 15 minutes  afterwards, and to report any adverse reaction to me immediately.    Drug Amount Wasted:  None.  Vial/Syringe: Syringe      Seymour Woodard  6/9/2021  8:16 AM

## 2021-06-18 NOTE — TELEPHONE ENCOUNTER
RECORDS RECEIVED FROM: Internal   DATE RECEIVED: 08.09.2021   NOTES STATUS DETAILS   OFFICE NOTE from referring provider Internal 06.09.2021 Estee Cabrera MD   OFFICE NOTE from other specialist  Internal 02.17.2021 Nia Chavez MD   *Only VASCULITIS or LUPUS gather office notes for the following N/A    *PULMONARY   N/A    *ENT N/A    *DERMATOLOGY N/A    *RHEUMATOLOGY N/A    DISCHARGE SUMMARY from hospital N/A    DISCHARGE REPORT from the ER N/A    MEDICATION LIST Internal    IMAGING  (NEED IMAGES AND REPORTS)     KIDNEY CT SCAN N/A    KIDNEY ULTRASOUND N/A    MR ABDOMEN N/A    NUCLEAR MEDICINE RENAL N/A    LABS     CBC Internal 11.14.2019   CMP Internal 11.14.2019   BMP Internal 02.17.2021   UA Internal 02.17.2021   URINE PROTEIN Internal 02.17.2021   RENAL PANEL N/A    BIOPSY     KIDNEY BIOPSY  N/A

## 2021-08-02 DIAGNOSIS — R31.29 MICROHEMATURIA: Primary | ICD-10-CM

## 2021-08-09 ENCOUNTER — PRE VISIT (OUTPATIENT)
Dept: NEPHROLOGY | Facility: CLINIC | Age: 51
End: 2021-08-09

## 2021-09-11 ENCOUNTER — HEALTH MAINTENANCE LETTER (OUTPATIENT)
Age: 51
End: 2021-09-11

## 2021-10-06 ENCOUNTER — LAB (OUTPATIENT)
Dept: LAB | Facility: CLINIC | Age: 51
End: 2021-10-06
Payer: COMMERCIAL

## 2021-10-06 DIAGNOSIS — R31.29 MICROHEMATURIA: ICD-10-CM

## 2021-10-06 LAB
ALBUMIN SERPL-MCNC: 4 G/DL (ref 3.4–5)
ALBUMIN UR-MCNC: NEGATIVE MG/DL
ANION GAP SERPL CALCULATED.3IONS-SCNC: 4 MMOL/L (ref 3–14)
APPEARANCE UR: CLEAR
BACTERIA #/AREA URNS HPF: ABNORMAL /HPF
BILIRUB UR QL STRIP: NEGATIVE
BUN SERPL-MCNC: 13 MG/DL (ref 7–30)
CALCIUM SERPL-MCNC: 9.4 MG/DL (ref 8.5–10.1)
CHLORIDE BLD-SCNC: 104 MMOL/L (ref 94–109)
CO2 SERPL-SCNC: 27 MMOL/L (ref 20–32)
COLOR UR AUTO: YELLOW
CREAT SERPL-MCNC: 0.78 MG/DL (ref 0.52–1.04)
CREAT UR-MCNC: 20 MG/DL
ERYTHROCYTE [DISTWIDTH] IN BLOOD BY AUTOMATED COUNT: 12.8 % (ref 10–15)
GFR SERPL CREATININE-BSD FRML MDRD: 90 ML/MIN/1.73M2
GLUCOSE BLD-MCNC: 85 MG/DL (ref 70–99)
GLUCOSE UR STRIP-MCNC: NEGATIVE MG/DL
HCT VFR BLD AUTO: 37.7 % (ref 35–47)
HGB BLD-MCNC: 12.5 G/DL (ref 11.7–15.7)
HGB UR QL STRIP: ABNORMAL
KETONES UR STRIP-MCNC: NEGATIVE MG/DL
LEUKOCYTE ESTERASE UR QL STRIP: NEGATIVE
MCH RBC QN AUTO: 28.9 PG (ref 26.5–33)
MCHC RBC AUTO-ENTMCNC: 33.2 G/DL (ref 31.5–36.5)
MCV RBC AUTO: 87 FL (ref 78–100)
NITRATE UR QL: NEGATIVE
PH UR STRIP: 6 [PH] (ref 5–7)
PHOSPHATE SERPL-MCNC: 3.7 MG/DL (ref 2.5–4.5)
PLATELET # BLD AUTO: 308 10E3/UL (ref 150–450)
POTASSIUM BLD-SCNC: 3.7 MMOL/L (ref 3.4–5.3)
PROT UR-MCNC: <0.05 G/L
PROT/CREAT 24H UR: NORMAL MG/G{CREAT}
PTH-INTACT SERPL-MCNC: 30 PG/ML (ref 18–80)
RBC # BLD AUTO: 4.32 10E6/UL (ref 3.8–5.2)
RBC #/AREA URNS AUTO: ABNORMAL /HPF
SODIUM SERPL-SCNC: 135 MMOL/L (ref 133–144)
SP GR UR STRIP: <=1.005 (ref 1–1.03)
UROBILINOGEN UR STRIP-ACNC: 0.2 E.U./DL
WBC # BLD AUTO: 5.6 10E3/UL (ref 4–11)
WBC #/AREA URNS AUTO: ABNORMAL /HPF

## 2021-10-06 PROCEDURE — 36415 COLL VENOUS BLD VENIPUNCTURE: CPT

## 2021-10-06 PROCEDURE — 84156 ASSAY OF PROTEIN URINE: CPT

## 2021-10-06 PROCEDURE — 85027 COMPLETE CBC AUTOMATED: CPT

## 2021-10-06 PROCEDURE — 80069 RENAL FUNCTION PANEL: CPT

## 2021-10-06 PROCEDURE — 81001 URINALYSIS AUTO W/SCOPE: CPT

## 2021-10-06 PROCEDURE — 83970 ASSAY OF PARATHORMONE: CPT

## 2021-10-13 ENCOUNTER — VIRTUAL VISIT (OUTPATIENT)
Dept: NEPHROLOGY | Facility: CLINIC | Age: 51
End: 2021-10-13
Attending: UROLOGY
Payer: COMMERCIAL

## 2021-10-13 DIAGNOSIS — R31.9 HEMATURIA, UNSPECIFIED TYPE: Primary | ICD-10-CM

## 2021-10-13 PROCEDURE — 99203 OFFICE O/P NEW LOW 30 MIN: CPT | Mod: 95 | Performed by: STUDENT IN AN ORGANIZED HEALTH CARE EDUCATION/TRAINING PROGRAM

## 2021-10-13 NOTE — Clinical Note
-Patient needs to have BMP done Biannually.  -Please also check her TSH  -if her kidney function worsen or develop proteinuria she will require a follow up. For now we can monitor the patient with you

## 2021-10-13 NOTE — PROGRESS NOTES
Brianna is a 50 year old who is being evaluated via a billable video visit.      How would you like to obtain your AVS? MyChart  If the video visit is dropped, the invitation should be resent by: Text to cell phone: [phone]  Will anyone else be joining your video visit? No      Video Start Time: 1:05  Video-Visit Details    Type of service:  Video Visit    Video End Time:1:38    Originating Location (pt. Location): Home    Distant Location (provider location):  [facility] [place]     Platform used for Video Visit: [facility]     Assessment and Plan:  49 Y/O F with PMH micro hematuria, and dyspareunia who  Was referred for micro hematuria and nephrocalcinosis.    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 In the UA there were some blood notice and there UA did not look infected. patient was referred to urology who did a Ct urogram which showed in the left kidney focal calcification.Patient did have cystoscopy which ruled out malignancy.Given that both his sister and mother have history of hematuria and given normal kidney function  therefore its likely benign condition such as thin membrane disease  -She should follow up with her PCP and monitor kidney function biannually  -No need to follow nephrology  unless her kidney function changes or there is protein in the urine    Focal calcification of left kidney medullary:  Patient had CT urogram  Which showed focal calcification of left kidney medulla which appeared as mild nephrocalcinosis. It could be an artifact. But given that the parathyroid hormone level is normal, vitamin D level is normal.  -Consider getting TSH level done in PCP office as it is associated with nephrocalcinosis  -She should have Bi annually renal function check   -No need to follow up with nephrology unless something changes in terms of kidney function        # Electrolytes:   -  Potassium; level: Normal    # Mineral Bone Disorder:   - Calcium; level is:  Normal     Assessment and plan was discussed with patient and she voiced her understanding and agreement.    Consult:  Brianna Pleitez Louann was seen in consultation at the request of Dr. Chavez for hematuria and concern for focal  Left medullary nephrocalcinosis.      HPI:  49 Y/O F with PMH micro hematuria, and dyspareunia who  Was referred for micro hematuria and nephrocalcinosis.Patient was found to have hematuria once she was being work up for UTI. In the UA there were some blood notice and the UA did not look infected.     Patient was referred to urology who did a Ct urogram which showed in the left kidney focal mild calcification of medulla.Patient did have cystoscopy which ruled out malignancy. both her sister and mother have history of hematuria and have not been work up for this. Patient Parathyroid level is 30, bicarb is 27, potassium is 3.7 creatinine is 0.78, calcium is 9.4.She does not consumes tums and has been off vitamin D supplements. Currently not on any medication.Deneis any family history of renal disease, edema, shortness of breath, gross hematuria, and foaminess to urine.       ROS:   A comprehensive review of systems was obtained and negative, except as noted in the HPI or PMH.    Active Medical Problems:  Patient Active Problem List   Diagnosis     Primary localized osteoarthrosis, hand     CARDIOVASCULAR SCREENING; LDL GOAL LESS THAN 160     Female infertility of other specified origin     Microhematuria     Dyspareunia in female     Partner relationship problems     Atrophic vaginitis     PMH:   Medical record was reviewed and PMH was discussed with patient and noted below.  Past Medical History:   Diagnosis Date     Other abnormal heart sounds     told she doesn't need abx     Unspecified breast disorder     pea-like structure 10 yrs ago, u/s x 2 normal, last one in '95     PSH:   Past Surgical History:   Procedure  Laterality Date     HC TOOTH EXTRACTION W/FORCEP      wisdom teeth       Family Hx:   Family History   Problem Relation Age of Onset     Hematuria Mother         Underwent many tests years ago and never found cause     Hypertension Father      Hematuria Father         Was investgated years ago but not sure if cause was found or treated and resolved with antibiotics     Hearing Loss Father         Can still hear, but using hearing aids     Cerebrovascular Disease Maternal Grandmother 82     Cancer Maternal Grandmother         skin     Heart Disease Maternal Grandmother         Congestive heart failure in 80 s     Cerebrovascular Disease Maternal Grandfather         passed away     Arthritis Maternal Grandfather         Had arthritis, but not sure what kind     Hypertension Paternal Grandmother      Hearing Loss Paternal Grandmother         Could still hear, but hearing impared     Hypertension Paternal Grandfather      Diabetes Paternal Grandfather      C.A.D. Paternal Grandfather 62        MI in early 60s     Enlarged prostate Paternal Grandfather         Biopsy done, no malignancy found, managing with medication     Heart Disease Paternal Grandfather          of heart attack at 64     Cancer Paternal Uncle 54        pancreatic cancer- passed away at 54     Breast Cancer Maternal Aunt 66     Cancer Paternal Aunt         tongue ca, smoker/etoh     Breast Cancer Maternal Cousin 35        mastectomy, chemo planned     Hematuria Sister         Detected a couple of years ago but was not investigated     Cancer Other         Breast cancer in 70 s     Cancer Other         Pancreatic cancer in 50 s     Personal Hx:   Social History     Tobacco Use     Smoking status: Never Smoker     Smokeless tobacco: Never Used   Substance Use Topics     Alcohol use: Yes     Alcohol/week: 0.0 standard drinks     Comment: social, 1 drink or less per day       Allergies:  Allergies   Allergen Reactions     Sulfa Drugs Hives and Swelling      Facial swelling, hives, heart racing.       Medications:  Current Outpatient Medications   Medication Sig     COMPOUNDED NON-CONTROLLED SUBSTANCE (CMPD RX) - PHARMACY TO MIX COMPOUNDED MEDICATION Estriol 1 mg/g in HRT base, apply small amount to finger and apply to inside vagina daily for 2 weeks then twice weekly     NO ACTIVE MEDICATIONS      No current facility-administered medications for this visit.      Vitals:  There were no vitals taken for this visit.    Exam:(done over the video)  GENERAL APPEARANCE: alert and no distress  EDEMA: no LE edema bilaterally  MS: extremities normal - no gross deformities noted, no evidence of inflammation in joints, no muscle tenderness  SKIN: no rash    LABS:   CMP  Recent Labs   Lab Test 10/06/21  0957 02/17/21  0845 12/10/19  0750 11/14/19  0832    139  --  138   POTASSIUM 3.7 4.0  --  3.4   CHLORIDE 104 106  --  106   CO2 27 26  --  25   ANIONGAP 4 7  --  7   GLC 85 101* 98 115*   BUN 13 15  --  7   CR 0.78 0.77  --  0.72   GFRESTIMATED 90 >90  --  >90   GFRESTBLACK  --  >90  --  >90   LAURIE 9.4 9.6  --  8.8     Recent Labs   Lab Test 11/14/19  0832   BILITOTAL 0.4   ALKPHOS 79   ALT 36   AST 16     CBC  Recent Labs   Lab Test 10/06/21  0957 11/14/19  0832   HGB 12.5 12.8   WBC 5.6 3.6*   RBC 4.32 4.44   HCT 37.7 38.9   MCV 87 88   MCH 28.9 28.8   MCHC 33.2 32.9   RDW 12.8 12.6    262     URINE STUDIES  Recent Labs   Lab Test 10/06/21  0957 06/09/21  0851 02/17/21  0845 12/10/19  0842 06/08/16  1100 06/08/16  1100   COLOR Yellow Yellow Yellow Yellow   < > Yellow   APPEARANCE Clear Clear Clear Clear   < > Clear   URINEGLC Negative Negative Negative Negative   < > Negative   URINEBILI Negative Negative Negative Negative   < > Negative   URINEKETONE Negative Negative Negative Negative   < > Negative   SG <=1.005 <1.005 1.020 1.010   < > <=1.005   UBLD Large* Large* Large* Moderate*   < > Large*   URINEPH 6.0 5.5 5.5 6.0   < > 5.0   PROTEIN Negative Negative 30*  Negative   < > Trace*   UROBILINOGEN 0.2 0.2 0.2 0.2   < > 0.2   NITRITE Negative Negative Negative Negative   < > Positive*   LEUKEST Negative Negative Trace* Trace*   < > Small*   RBCU 2-5*  --  10-25* 5-10*  --  2-5*   WBCU 0-5  --  0 - 5 0 - 5  --  5-10*    < > = values in this interval not displayed.     No lab results found.  PTH  Recent Labs   Lab Test 10/06/21  0957   PTHI 30     IRON STUDIES  No lab results found.      Wilfrid Coyne MD

## 2021-10-13 NOTE — LETTER
10/13/2021       RE: Brianna Lew  [address]  [facility] [address]     Dear Colleague,    Thank you for referring your patient, Brianna Lew, to the [facility] [place] at [facility]. Please see a copy of my visit note below.    Brianna is a 50 year old who is being evaluated via a billable video visit.      How would you like to obtain your AVS? MyChart  If the video visit is dropped, the invitation should be resent by: Text to cell phone: [phone]  Will anyone else be joining your video visit? No      Video Start Time: 1:05  Video-Visit Details    Type of service:  Video Visit    Video End Time:1:38    Originating Location (pt. Location): Home    Distant Location (provider location):  [facility] [place]     Platform used for Video Visit: [facility]     Assessment and Plan:  51 Y/O F with PMH micro hematuria, and dyspareunia who  Was referred for micro hematuria and nephrocalcinosis.    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 In the UA there were some blood notice and there UA did not look infected. patient was referred to urology who did a Ct urogram which showed in the left kidney focal calcification.Patient did have cystoscopy which ruled out malignancy.Given that both his sister and mother have history of hematuria and given normal kidney function  therefore its likely benign condition such as thin membrane disease  -She should follow up with her PCP and monitor kidney function biannually  -No need to follow nephrology  unless her kidney function changes or there is protein in the urine    Focal calcification of left kidney medullary:  Patient had CT urogram  Which showed focal calcification of left kidney medulla which appeared as mild nephrocalcinosis. It could be an artifact. But given that  the parathyroid hormone level is normal, vitamin D level is normal.  -Consider getting TSH level done in PCP office as it is associated with nephrocalcinosis  -She should have Bi annually renal function check   -No need to follow up with nephrology unless something changes in terms of kidney function        # Electrolytes:   - Potassium; level: Normal    # Mineral Bone Disorder:   - Calcium; level is:  Normal     Assessment and plan was discussed with patient and she voiced her understanding and agreement.    Consult:  Janell Poldoski was seen in consultation at the request of Dr. Chavez for hematuria and concern for focal  Left medullary nephrocalcinosis.      HPI:  51 Y/O F with PMH micro hematuria, and dyspareunia who  Was referred for micro hematuria and nephrocalcinosis.Patient was found to have hematuria once she was being work up for UTI. In the UA there were some blood notice and the UA did not look infected.     Patient was referred to urology who did a Ct urogram which showed in the left kidney focal mild calcification of medulla.Patient did have cystoscopy which ruled out malignancy. both her sister and mother have history of hematuria and have not been work up for this. Patient Parathyroid level is 30, bicarb is 27, potassium is 3.7 creatinine is 0.78, calcium is 9.4.She does not consumes tums and has been off vitamin D supplements. Currently not on any medication.Deneis any family history of renal disease, edema, shortness of breath, gross hematuria, and foaminess to urine.       ROS:   A comprehensive review of systems was obtained and negative, except as noted in the HPI or PMH.    Active Medical Problems:  Patient Active Problem List   Diagnosis     Primary localized osteoarthrosis, hand     CARDIOVASCULAR SCREENING; LDL GOAL LESS THAN 160     Female infertility of other specified origin     Microhematuria     Dyspareunia in female     Partner relationship problems     Atrophic vaginitis     PMH:    Medical record was reviewed and PMH was discussed with patient and noted below.  Past Medical History:   Diagnosis Date     Other abnormal heart sounds     told she doesn't need abx     Unspecified breast disorder     pea-like structure 10 yrs ago, u/s x 2 normal, last one in      PSH:   Past Surgical History:   Procedure Laterality Date     HC TOOTH EXTRACTION W/FORCEP      wisdom teeth       Family Hx:   Family History   Problem Relation Age of Onset     Hematuria Mother         Underwent many tests years ago and never found cause     Hypertension Father      Hematuria Father         Was investgated years ago but not sure if cause was found or treated and resolved with antibiotics     Hearing Loss Father         Can still hear, but using hearing aids     Cerebrovascular Disease Maternal Grandmother 82     Cancer Maternal Grandmother         skin     Heart Disease Maternal Grandmother         Congestive heart failure in 80 s     Cerebrovascular Disease Maternal Grandfather         passed away     Arthritis Maternal Grandfather         Had arthritis, but not sure what kind     Hypertension Paternal Grandmother      Hearing Loss Paternal Grandmother         Could still hear, but hearing impared     Hypertension Paternal Grandfather      Diabetes Paternal Grandfather      C.A.D. Paternal Grandfather 62        MI in early 60s     Enlarged prostate Paternal Grandfather         Biopsy done, no malignancy found, managing with medication     Heart Disease Paternal Grandfather          of heart attack at 64     Cancer Paternal Uncle 54        pancreatic cancer- passed away at 54     Breast Cancer Maternal Aunt 66     Cancer Paternal Aunt         tongue ca, smoker/etoh     Breast Cancer Maternal Cousin 35        mastectomy, chemo planned     Hematuria Sister         Detected a couple of years ago but was not investigated     Cancer Other         Breast cancer in 70 s     Cancer Other         Pancreatic cancer in  50 s     Personal Hx:   Social History     Tobacco Use     Smoking status: Never Smoker     Smokeless tobacco: Never Used   Substance Use Topics     Alcohol use: Yes     Alcohol/week: 0.0 standard drinks     Comment: social, 1 drink or less per day       Allergies:  Allergies   Allergen Reactions     Sulfa Drugs Hives and Swelling     Facial swelling, hives, heart racing.       Medications:  Current Outpatient Medications   Medication Sig     COMPOUNDED NON-CONTROLLED SUBSTANCE (CMPD RX) - PHARMACY TO MIX COMPOUNDED MEDICATION Estriol 1 mg/g in HRT base, apply small amount to finger and apply to inside vagina daily for 2 weeks then twice weekly     NO ACTIVE MEDICATIONS      No current facility-administered medications for this visit.      Vitals:  There were no vitals taken for this visit.    Exam:(done over the video)  GENERAL APPEARANCE: alert and no distress  EDEMA: no LE edema bilaterally  MS: extremities normal - no gross deformities noted, no evidence of inflammation in joints, no muscle tenderness  SKIN: no rash    LABS:   CMP  Recent Labs   Lab Test 10/06/21  0957 02/17/21  0845 12/10/19  0750 11/14/19  0832    139  --  138   POTASSIUM 3.7 4.0  --  3.4   CHLORIDE 104 106  --  106   CO2 27 26  --  25   ANIONGAP 4 7  --  7   GLC 85 101* 98 115*   BUN 13 15  --  7   CR 0.78 0.77  --  0.72   GFRESTIMATED 90 >90  --  >90   GFRESTBLACK  --  >90  --  >90   LAURIE 9.4 9.6  --  8.8     Recent Labs   Lab Test 11/14/19  0832   BILITOTAL 0.4   ALKPHOS 79   ALT 36   AST 16     CBC  Recent Labs   Lab Test 10/06/21  0957 11/14/19  0832   HGB 12.5 12.8   WBC 5.6 3.6*   RBC 4.32 4.44   HCT 37.7 38.9   MCV 87 88   MCH 28.9 28.8   MCHC 33.2 32.9   RDW 12.8 12.6    262     URINE STUDIES  Recent Labs   Lab Test 10/06/21  0957 06/09/21  0851 02/17/21  0845 12/10/19  0842 06/08/16  1100 06/08/16  1100   COLOR Yellow Yellow Yellow Yellow   < > Yellow   APPEARANCE Clear Clear Clear Clear   < > Clear   URINEGLC Negative  Negative Negative Negative   < > Negative   URINEBILI Negative Negative Negative Negative   < > Negative   URINEKETONE Negative Negative Negative Negative   < > Negative   SG <=1.005 <1.005 1.020 1.010   < > <=1.005   UBLD Large* Large* Large* Moderate*   < > Large*   URINEPH 6.0 5.5 5.5 6.0   < > 5.0   PROTEIN Negative Negative 30* Negative   < > Trace*   UROBILINOGEN 0.2 0.2 0.2 0.2   < > 0.2   NITRITE Negative Negative Negative Negative   < > Positive*   LEUKEST Negative Negative Trace* Trace*   < > Small*   RBCU 2-5*  --  10-25* 5-10*  --  2-5*   WBCU 0-5  --  0 - 5 0 - 5  --  5-10*    < > = values in this interval not displayed.     No lab results found.  PTH  Recent Labs   Lab Test 10/06/21  0957   PTHI 30     IRON STUDIES  No lab results found.      Wilfrid Coyne MD      Attestation signed by Marcell Song MD at 10/13/2021  5:05 PM:  Physician Attestation   I, Marcell Song MD, saw this patient and agree with the findings and plan of care as documented in the note.      Items personally reviewed/procedural attestation: vitals, labs, and provider notes.    Marcell Song MD   Renal       Again, thank you for allowing me to participate in the care of your patient.      Sincerely,    Wilfrid Coyne MD

## 2021-10-13 NOTE — PATIENT INSTRUCTIONS
-Get your kidney function check twice a year with the Primary care and if function remains normal no need to follow up with nephrology  -Get TSH with the PCP

## 2021-10-24 ENCOUNTER — MYC MEDICAL ADVICE (OUTPATIENT)
Dept: FAMILY MEDICINE | Facility: CLINIC | Age: 51
End: 2021-10-24

## 2021-10-26 NOTE — TELEPHONE ENCOUNTER
CW  Please see Where Was it Filmedt message  Did you want a VV or eVisit for this?    Thank you,  Soheila Santos RN  [place]

## 2021-11-02 NOTE — PROGRESS NOTES
Brianna is a 50 year old who is being evaluated via a billable video visit.      How would you like to obtain your AVS? MyChart  If the video visit is dropped, the invitation should be resent by: Text to cell phone: -  Will anyone else be joining your video visit? No           Assessment & Plan       ICD-10-CM    1. Postmenopausal bleeding  N95.0 US Pelvic Complete with Transvaginal     CANCELED: US Pelvic Complete with Transvaginal   2. Microhematuria  R31.29 UA with Microscopic reflex to Culture - lab collect   3. Nephrocalcinosis  E83.59 TSH with free T4 reflex    N29 Basic metabolic panel  (Ca, Cl, CO2, Creat, Gluc, K, Na, BUN)     Albumin Random Urine Quantitative with Creat Ratio   4. CARDIOVASCULAR SCREENING; LDL GOAL LESS THAN 160  Z13.6 Lipid panel reflex to direct LDL Fasting   5. Sore throat  J02.9       Postemenopausal bleeding-   One episode of bleeding a couple weeks ago, after not having a period for a few years.  No other vaginal/GI sx's.  Tends to get anxious about health issues- would like to start work-up now rather than waiting.  Will send for pelvic ultrasound with ob/gyn appt afterwards so she can have endo bx done if indicated based on results.    Microhematuria/nephrocalcinosis-   RBC's found on UA's, so sent to urology (Dr. Cabrera)- work-up benign except for finding of nephrocalcinosis on CT urogram in left kidney.  Referred on to nephrology, who thought pt should have twice yearly BMP checks, a TSH check and return back to nephrology if any worsening/concerning sx's.    Sore throat- for past two days, though none today.   Started prior to COVID booster, which gave mild fever, body aches and left axillary discomfort.  Will check covid test prior to family coming to visit this weekend.      36 minutes spent on the date of the encounter doing chart review, review of outside records, review of test results, interpretation of tests, patient visit and documentation     Nia Chavez MD  M  [facility]        Burke Reed is a 50 year old who presents for the following health issues- irregular bleeding    HPI     She's had some vaginal spotting, overnight.  Tiny residual spots  in the morning.  Morning after the nephrology appt- 10/13/21.  No bleeding since then.    Hasn't had a period in a few yrs.  Prior to stopping, would have bleeding about q6 months, and then would get heavy bleeding.  Last sexually active about a year ago.      Dr. Cabrera (urology) recommended vaginal estrogen to help with atrophic changes.  Tried some, but then was gone a bunch over the summer, and stopped.  Hasn't used it since the summer, and hasn't really missed it.        Hematuria f/u-   Saw Dr. Cabrera, urology, reassuring, except for nephrocalcinosis, so referred to nephrology.  She had an appt with the kidney doctor- couldn't find anything wrong.  Next step would be kidney bx, but that seems overkill given her normal labs.  He recommended labs...   TSH, BMP twice yearly.        Mild cold sx's-  Just had COVID booster on Monday.  She was starting to have a ST when she got the booster.  ST.  After the booster, developed fever, axillary soreness, aches.  Mostly sheltered, and did go to a concert on Saturday (but req proof of vaccine to go).  Has family coming in this weekend- wants to make sure she keeps them safe.        Review of Systems   Constitutional, HEENT, cardiovascular, pulmonary, gi and gu systems are negative, except as otherwise noted.      Objective           Vitals:  No vitals were obtained today due to virtual visit.    Physical Exam   GENERAL: Healthy, alert and no distress  EYES: Eyes grossly normal to inspection.  No discharge or erythema, or obvious scleral/conjunctival abnormalities.  RESP: No audible wheeze, cough, or visible cyanosis.  No visible retractions or increased work of breathing.    SKIN: Visible skin clear. No significant rash, abnormal pigmentation or lesions.  NEURO:  Cranial nerves grossly intact.  Mentation and speech appropriate for age.  PSYCH: Mentation appears normal, affect normal/bright, judgement and insight intact, normal speech and appearance well-groomed.            Video-Visit Details    Type of service:  Video Visit    Video Start Time: 11:32 AM  Video End Time:12:00 PM    Originating Location (pt. Location): Home    Distant Location (provider location):  [facility]     Platform used for Video Visit: [employer]

## 2021-11-03 ENCOUNTER — VIRTUAL VISIT (OUTPATIENT)
Dept: FAMILY MEDICINE | Facility: CLINIC | Age: 51
End: 2021-11-03
Payer: COMMERCIAL

## 2021-11-03 DIAGNOSIS — J02.9 SORE THROAT: ICD-10-CM

## 2021-11-03 DIAGNOSIS — N29 NEPHROCALCINOSIS: ICD-10-CM

## 2021-11-03 DIAGNOSIS — E83.59 NEPHROCALCINOSIS: ICD-10-CM

## 2021-11-03 DIAGNOSIS — N95.0 POSTMENOPAUSAL BLEEDING: Primary | ICD-10-CM

## 2021-11-03 DIAGNOSIS — R31.29 MICROHEMATURIA: ICD-10-CM

## 2021-11-03 DIAGNOSIS — Z13.6 CARDIOVASCULAR SCREENING; LDL GOAL LESS THAN 160: ICD-10-CM

## 2021-11-03 PROCEDURE — 99214 OFFICE O/P EST MOD 30 MIN: CPT | Mod: 95 | Performed by: FAMILY MEDICINE

## 2021-11-09 PROBLEM — Z63.0 PARTNER RELATIONSHIP PROBLEMS: Status: RESOLVED | Noted: 2018-07-16 | Resolved: 2021-11-09

## 2021-12-08 ENCOUNTER — OFFICE VISIT (OUTPATIENT)
Dept: OBGYN | Facility: CLINIC | Age: 51
End: 2021-12-08
Payer: COMMERCIAL

## 2021-12-08 ENCOUNTER — ANCILLARY PROCEDURE (OUTPATIENT)
Dept: ULTRASOUND IMAGING | Facility: CLINIC | Age: 51
End: 2021-12-08
Attending: FAMILY MEDICINE
Payer: COMMERCIAL

## 2021-12-08 VITALS
HEIGHT: 64 IN | SYSTOLIC BLOOD PRESSURE: 127 MMHG | OXYGEN SATURATION: 98 % | WEIGHT: 129 LBS | DIASTOLIC BLOOD PRESSURE: 73 MMHG | BODY MASS INDEX: 22.02 KG/M2 | HEART RATE: 98 BPM

## 2021-12-08 DIAGNOSIS — N95.0 PMB (POSTMENOPAUSAL BLEEDING): Primary | ICD-10-CM

## 2021-12-08 DIAGNOSIS — N95.0 POSTMENOPAUSAL BLEEDING: ICD-10-CM

## 2021-12-08 PROCEDURE — 76856 US EXAM PELVIC COMPLETE: CPT | Performed by: OBSTETRICS & GYNECOLOGY

## 2021-12-08 PROCEDURE — 76830 TRANSVAGINAL US NON-OB: CPT | Performed by: OBSTETRICS & GYNECOLOGY

## 2021-12-08 PROCEDURE — 99202 OFFICE O/P NEW SF 15 MIN: CPT | Performed by: OBSTETRICS & GYNECOLOGY

## 2021-12-08 ASSESSMENT — MIFFLIN-ST. JEOR: SCORE: 1190.14

## 2021-12-08 NOTE — PATIENT INSTRUCTIONS
Endometrial Biopsy  Post-Procedure Patient Instructions      Please monitor for any of the following:      Fever   Cramping after 48 hours   Bleeding for 24-48 hours that is heavier than a normal period   Any bleeding that soaks more than 1 pad per hour      Please call your health care provider if you develop any of the above symptoms or problems.     [facility]   Nurse Triage Line [phone]      Use pads, not tampons, for the bleeding. You may resume sexual relations in 2-3 days after bleeding has stopped.

## 2021-12-19 NOTE — PROGRESS NOTES
Assessment & Plan     PMB (postmenopausal bleeding)  Reviewed ultrasound results and images  Discussed with patient criteria for excluding malignancy by ultrasound - reassured.   Bleeding likely from atrophy   Ok to resume topical vaginal estrogen.       Review of the result(s) of each unique test - pelvic ultrasound  17 minutes spent on the date of the encounter doing chart review, history and exam, documentation and further activities per the note           No follow-ups on file.    Rossy Kay MD  [facility]    Burke Reed is a 51 year old who presents for the following health issues     HPI   Postmenopausal bleeding  One short episode  Unprovoked  Has not recently been using topical vaginal estrogen.     Past Medical History:   Diagnosis Date     Other abnormal heart sounds     told she doesn't need abx     Unspecified breast disorder     pea-like structure 10 yrs ago, u/s x 2 normal, last one in '95       Past Surgical History:   Procedure Laterality Date     HC TOOTH EXTRACTION W/FORCEP      wisdom teeth       Family History   Problem Relation Age of Onset     Hematuria Mother         Underwent many tests years ago and never found cause     Hypertension Father      Hematuria Father         Was investgated years ago but not sure if cause was found or treated and resolved with antibiotics     Hearing Loss Father         Can still hear, but using hearing aids     Cerebrovascular Disease Maternal Grandmother 82     Cancer Maternal Grandmother         skin     Heart Disease Maternal Grandmother         Congestive heart failure in 80 s     Cerebrovascular Disease Maternal Grandfather         passed away     Arthritis Maternal Grandfather         Had arthritis, but not sure what kind     Hypertension Paternal Grandmother      Hearing Loss Paternal Grandmother         Could still hear, but hearing impared     Hypertension Paternal Grandfather      Diabetes Paternal  Grandfather      MARY. Paternal Grandfather 62        MI in early 60s     Enlarged prostate Paternal Grandfather         Biopsy done, no malignancy found, managing with medication     Heart Disease Paternal Grandfather          of heart attack at 64     Cancer Paternal Uncle 54        pancreatic cancer- passed away at 54     Breast Cancer Maternal Aunt 66     Cancer Paternal Aunt         tongue ca, smoker/etoh     Breast Cancer Maternal Cousin 35        mastectomy, chemo planned     Hematuria Sister         Detected a couple of years ago but was not investigated     Cancer Other         Breast cancer in 70 s     Cancer Other         Pancreatic cancer in 50 s       Social History     Socioeconomic History     Marital status:      Spouse name: Aurelio Naidu     Number of children: 0     Years of education: Not on file     Highest education level: Not on file   Occupational History     Occupation:      Employer: [employer]   Tobacco Use     Smoking status: Never Smoker     Smokeless tobacco: Never Used   Substance and Sexual Activity     Alcohol use: Yes     Alcohol/week: 0.0 standard drinks     Comment: social, 1 drink or less per day     Drug use: No     Sexual activity: Not Currently     Partners: Male     Birth control/protection: None   Other Topics Concern     Parent/sibling w/ CABG, MI or angioplasty before 65F 55M? No   Social History Narrative    Social Documentation:        Balanced Diet: YES    Calcium intake: more than 2 per day    Caffeine: 0 per day    Exercise:  type of activity running;  3-4 times per week, less lately with work for few months    Sunscreen: Yes    Seatbelts:  Yes    Self Breast Exam:  Yes    Self Testicular Exam: n/a    Physical/Emotional/Sexual Abuse: No    Do you feel safe in your environment? Yes        Cholesterol screen up to date: Yes    CHOL      165   3/26/08    HDL       67   3/26/08    LDL       89   3/26/08    TRIG       45   3/26/08    CHOLHDLRATIO   "    2.5   3/26/08        Eye Exam up to date: No    Dental Exam up to date: Yes    Pap smear up to date: No: will do today    Mammogram up to date: Does Not Apply    Dexa Scan up to date: Does Not Apply    Colonoscopy up to date: Does Not Apply    Immunizations up to date: Yes    Glucose screen if over 40:  No                 Social Determinants of Health     Financial Resource Strain: Not on file   Food Insecurity: Not on file   Transportation Needs: Not on file   Physical Activity: Not on file   Stress: Not on file   Social Connections: Not on file   Intimate Partner Violence: Not on file   Housing Stability: Not on file       Current Outpatient Medications   Medication     COMPOUNDED NON-CONTROLLED SUBSTANCE (CMPD RX) - PHARMACY TO MIX COMPOUNDED MEDICATION     NO ACTIVE MEDICATIONS     No current facility-administered medications for this visit.          Allergies   Allergen Reactions     Sulfa Drugs Hives and Swelling     Facial swelling, hives, heart racing.         Review of Systems   Constitutional, HEENT, cardiovascular, pulmonary, gi and gu systems are negative, except as otherwise noted.      Objective    /73   Pulse 98   Ht 1.626 m (5' 4\")   Wt 58.5 kg (129 lb)   SpO2 98%   BMI 22.14 kg/m    Body mass index is 22.14 kg/m .  Physical Exam   GENERAL: healthy, alert and no distress  MS: no gross musculoskeletal defects noted, no edema  PSYCH: mentation appears normal, affect normal/bright    Gynecological Ultrasound Report  Pelvic U/S - Transabdominal and Transvaginal   [facility]  Referring Provider: Nia Chavez MD  Sonographer:  Chelita Montenegro RDMS  Indication: Bleeding/Menses- Postmenopausal bleeding  LMP: No LMP recorded. Patient is premenopausal.     Gynecological Ultrasonography:   Uterus: anteverted. Contour is irregular w/ myomata: 1 Right Intramural 2.1 x 1.5 x 2.0 cm.   Size: 5.2 x 5.0 x 2.7 cm  Endometrium: Thickness Total 3.6 " mm     Right Ovary: 1.9 x 1.1 x 1.3 cm. Wnl  Left Ovary: 1.6 x 0.8 x 0.9 cm. Wnl  Cul de Sac Free Fluid: No free fluid     Impression:      The uterus and ovaries were visualized and no abnormalities were seen.  A small intramural fibroid was noted     Yessica Mendoza MD

## 2022-03-14 ENCOUNTER — HOSPITAL ENCOUNTER (OUTPATIENT)
Dept: MAMMOGRAPHY | Facility: CLINIC | Age: 52
Discharge: HOME | End: 2022-03-14
Attending: FAMILY MEDICINE | Admitting: FAMILY MEDICINE
Payer: COMMERCIAL

## 2022-03-14 DIAGNOSIS — Z12.31 VISIT FOR SCREENING MAMMOGRAM: ICD-10-CM

## 2022-03-14 PROCEDURE — 77067 SCR MAMMO BI INCL CAD: CPT

## 2022-04-20 ENCOUNTER — MYC MEDICAL ADVICE (OUTPATIENT)
Dept: FAMILY MEDICINE | Facility: CLINIC | Age: 52
End: 2022-04-20

## 2022-04-23 ENCOUNTER — HEALTH MAINTENANCE LETTER (OUTPATIENT)
Age: 52
End: 2022-04-23

## 2022-05-23 ENCOUNTER — LAB (OUTPATIENT)
Dept: LAB | Facility: CLINIC | Age: 52
End: 2022-05-23
Payer: COMMERCIAL

## 2022-05-23 DIAGNOSIS — N29 NEPHROCALCINOSIS: ICD-10-CM

## 2022-05-23 DIAGNOSIS — Z13.6 CARDIOVASCULAR SCREENING; LDL GOAL LESS THAN 160: ICD-10-CM

## 2022-05-23 DIAGNOSIS — E83.59 NEPHROCALCINOSIS: ICD-10-CM

## 2022-05-23 DIAGNOSIS — R31.29 MICROHEMATURIA: ICD-10-CM

## 2022-05-23 LAB
ALBUMIN UR-MCNC: NEGATIVE MG/DL
ANION GAP SERPL CALCULATED.3IONS-SCNC: 3 MMOL/L (ref 3–14)
APPEARANCE UR: CLEAR
BACTERIA #/AREA URNS HPF: NORMAL /HPF
BILIRUB UR QL STRIP: NEGATIVE
BUN SERPL-MCNC: 12 MG/DL (ref 7–30)
CALCIUM SERPL-MCNC: 9.1 MG/DL (ref 8.5–10.1)
CHLORIDE BLD-SCNC: 101 MMOL/L (ref 94–109)
CHOLEST SERPL-MCNC: 174 MG/DL
CO2 SERPL-SCNC: 30 MMOL/L (ref 20–32)
COLOR UR AUTO: YELLOW
CREAT SERPL-MCNC: 0.66 MG/DL (ref 0.52–1.04)
FASTING STATUS PATIENT QL REPORTED: YES
GFR SERPL CREATININE-BSD FRML MDRD: >90 ML/MIN/1.73M2
GLUCOSE BLD-MCNC: 100 MG/DL (ref 70–99)
GLUCOSE UR STRIP-MCNC: NEGATIVE MG/DL
HDLC SERPL-MCNC: 73 MG/DL
HGB UR QL STRIP: ABNORMAL
KETONES UR STRIP-MCNC: NEGATIVE MG/DL
LDLC SERPL CALC-MCNC: 88 MG/DL
LEUKOCYTE ESTERASE UR QL STRIP: ABNORMAL
NITRATE UR QL: NEGATIVE
NONHDLC SERPL-MCNC: 101 MG/DL
PH UR STRIP: 6 [PH] (ref 5–7)
POTASSIUM BLD-SCNC: 3.8 MMOL/L (ref 3.4–5.3)
RBC #/AREA URNS AUTO: NORMAL /HPF
SODIUM SERPL-SCNC: 134 MMOL/L (ref 133–144)
SP GR UR STRIP: 1.01 (ref 1–1.03)
TRIGL SERPL-MCNC: 66 MG/DL
TSH SERPL DL<=0.005 MIU/L-ACNC: 3.24 MU/L (ref 0.4–4)
UROBILINOGEN UR STRIP-ACNC: 0.2 E.U./DL
WBC #/AREA URNS AUTO: NORMAL /HPF

## 2022-05-23 PROCEDURE — 84443 ASSAY THYROID STIM HORMONE: CPT

## 2022-05-23 PROCEDURE — 82043 UR ALBUMIN QUANTITATIVE: CPT

## 2022-05-23 PROCEDURE — 80061 LIPID PANEL: CPT

## 2022-05-23 PROCEDURE — 80048 BASIC METABOLIC PNL TOTAL CA: CPT

## 2022-05-23 PROCEDURE — 81001 URINALYSIS AUTO W/SCOPE: CPT

## 2022-05-23 PROCEDURE — 36415 COLL VENOUS BLD VENIPUNCTURE: CPT

## 2022-05-24 LAB
CREAT UR-MCNC: 18 MG/DL
MICROALBUMIN UR-MCNC: 64 MG/L
MICROALBUMIN/CREAT UR: 355.56 MG/G CR (ref 0–25)

## 2022-05-24 NOTE — PROGRESS NOTES
Assessment & Plan       ICD-10-CM    1. Microhematuria  R31.29    2. Dyspareunia in female  N94.10    3. Nephrocalcinosis  E83.59     N29    4. Microalbuminuria  R80.9 Albumin Random Urine Quantitative with Creat Ratio   5. Vaginal bleeding  N93.9    6. Colon cancer screening  Z12.11 Adult Gastro Ref - Procedure Only      --Microhematuria-   Initial consult with Dr. Cabrera (urology) for microhematuria in early 3/22, and also discussed dyspareunia.  --For dyspareunia, rec follow-up with PT and massage therapist.  Noted PT and yoga helped.  Also rec vaginal estrogen cream, and could consider vaginal valium.  Sent for CT urogram, with follow-up cystoscopy done on 6/9/21 which was normal.   Rec checking urine q6 months with us for a couple yrs with return to urology if hematuria is increasing or if any gross hematuria.  CT urogram showed nephrocalcinosis- referred to nephrology.  --Nephrocalcinosis- Seen by Dr. 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  Follow-up with PCP, and check kidney function biannually, and check TSH.    Follow-up with renal if worsening kidney function.    Microalbuminuria- came back surprisingly high in 300s at recent check.  GFR was very good at >90, and urine protein had been normal with recent consults.  Will recheck today.      Vaginal bleeding- Saw Dr. Kay after pelvic ultrasound showed endometrial lining <4mm, so she didn't think she needed an endometrial bx.  She hasn't had any further vaginal bleeding.    Preventive cares-   Cont great exercise.    Referred for colonoscopy.    She'll get her shingrix vaccines likely at the pharmacy.  Not concerned about the slightly high HR on apple watch with mild elevation, no other concerning symptoms, general good health.        Return in about 6 months (around 11/30/2022) for Physical Exam, kidney labs recheck (UA,  microalb, BMP).      38 minutes spent on the date of the encounter doing chart review, review of outside records, review of test results, interpretation of tests, patient visit and documentation       Nia Chavez MD  [facility]          Burke Reed is a 51 year old who presents for the following health issues-     History of Present Illness       Reason for visit:  Blood test follow up after kidney doctor appointment    She eats 4 or more servings of fruits and vegetables daily.She consumes 0 sweetened beverage(s) daily.She exercises with enough effort to increase her heart rate 30 to 60 minutes per day.  She exercises with enough effort to increase her heart rate 4 days per week.   She is taking medications regularly.       Reviewed recent labs-   --LDL down from 110s to 88.  HDL still great at 88.  Trigs at 66.  --Glucose at 100.  --Normal TSH.  --Microalbumin quite high, which is surprising.  Recent urology and nephrologist consults reassuring.  Will recheck.      Kidney follow-up-  Initial consult with Dr. Cabrera (urology) for microhematuria in early 3/22, and also discussed dyspareunia.  For dyspareunia, rec follow-up with PT and massage therapist.  Noted PT and yoga helped.  Also rec vaginal estrogen cream, and could consider vaginal valium.  Sent for CT urogram, with follow-up cystoscopy done on 6/9/21 which was normal.   Rec checking urine q6 months with us for a couple yrs with return to urology if hematuria is increasing or if any gross hematuria.  CT urogram showed nephrocalcinosis- referred to nephrology.  Seen by Dr. 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  Follow-up with PCP, and check kidney function biannually, and check TSH.    Follow-up with renal if worsening kidney function.    Dyspareunia- Did do the vaginal estrogen from   "[facility]- did that for ~2 months, then got out of habit after a trip and stopped.  Not sexually active currently, so might restart in future.      Microalbumin lab - this one came back surprisingly elevated at 355 at recent lab check 5/23/22.  GFR great at >90.  UA last week was negative for blood for first time in awhile.  Urine protein checked through nephrology in 10/21 and it was normal.  Had seen urology and nephrology for hematuria, and then was found to have nephrocalcinosis.  Rec q6mo labs, and follow-up if abnls.      Vaginal bleeding-   Saw Dr. Kay after pelvic ultrasound showed endometrial lining <4mm, so she didn't think she needed an endometrial bx.  She hasn't had any further vaginal bleeding.      Had been running more, but started doing less to save her knees.  Now back to running just once weekly, but biking more, more aerobic exercise.  Also doing warrior sculpt 30 minutes twice daily.  Doing this regimen for about the last couple months- more biking/running.  CC-skiing in the winter.        -   Has done the 4th COVID shot.    Will schedule shingrix at pharmacy.  Will refer for colonoscopy.    Apple watch- occasionally goes up to 105 at night- wondering if she should be concerned.        Review of Systems   Constitutional, HEENT, cardiovascular, pulmonary, gi and gu systems are negative, except as otherwise noted.      Objective    /69   Pulse 91   Temp 97.5  F (36.4  C)   Ht 1.626 m (5' 4\")   Wt 57.8 kg (127 lb 8 oz)   SpO2 100%   BMI 21.89 kg/m    Body mass index is 21.89 kg/m .  Physical Exam   GENERAL APPEARANCE: healthy, alert and no distress     EYES: sclera clear, EOMI     RESP: lungs clear to auscultation - no rales, rhonchi or wheezes     CV: regular rates and rhythm, normal S1 S2, no S3 or S4 and no murmur, click or rub      Ext: warm, dry, no edema       Psych: full range affect, normal speech and grooming, judgement and insight intact     Lab on 05/23/2022   Component Date " Value Ref Range Status     TSH 05/23/2022 3.24  0.40 - 4.00 mU/L Final     Sodium 05/23/2022 134  133 - 144 mmol/L Final     Potassium 05/23/2022 3.8  3.4 - 5.3 mmol/L Final     Chloride 05/23/2022 101  94 - 109 mmol/L Final     Carbon Dioxide (CO2) 05/23/2022 30  20 - 32 mmol/L Final     Anion Gap 05/23/2022 3  3 - 14 mmol/L Final     Urea Nitrogen 05/23/2022 12  7 - 30 mg/dL Final     Creatinine 05/23/2022 0.66  0.52 - 1.04 mg/dL Final     Calcium 05/23/2022 9.1  8.5 - 10.1 mg/dL Final     Glucose 05/23/2022 100 (A) 70 - 99 mg/dL Final     GFR Estimate 05/23/2022 >90  >60 mL/min/1.73m2 Final    Effective December 21, 2021 eGFRcr in adults is calculated using the 2021 CKD-EPI creatinine equation which includes age and gender (Elinor et al., NEJ, DOI: 10.1056/DMOGni1821492)     Cholesterol 05/23/2022 174  <200 mg/dL Final     Triglycerides 05/23/2022 66  <150 mg/dL Final     Direct Measure HDL 05/23/2022 73  >=50 mg/dL Final     LDL Cholesterol Calculated 05/23/2022 88  <=100 mg/dL Final     Non HDL Cholesterol 05/23/2022 101  <130 mg/dL Final     Patient Fasting > 8hrs? 05/23/2022 Yes   Final     Creatinine Urine mg/dL 05/23/2022 18  mg/dL Final     Albumin Urine mg/L 05/23/2022 64  mg/L Final     Albumin Urine mg/g Cr 05/23/2022 355.56 (A) 0.00 - 25.00 mg/g Cr Final     Color Urine 05/23/2022 Yellow  Colorless, Straw, Light Yellow, Yellow Final     Appearance Urine 05/23/2022 Clear  Clear Final     Glucose Urine 05/23/2022 Negative  Negative mg/dL Final     Bilirubin Urine 05/23/2022 Negative  Negative Final     Ketones Urine 05/23/2022 Negative  Negative mg/dL Final     Specific Gravity Urine 05/23/2022 1.010  1.003 - 1.035 Final     Blood Urine 05/23/2022 Moderate (A) Negative Final     pH Urine 05/23/2022 6.0  5.0 - 7.0 Final     Protein Albumin Urine 05/23/2022 Negative  Negative mg/dL Final     Urobilinogen Urine 05/23/2022 0.2  0.2, 1.0 E.U./dL Final     Nitrite Urine 05/23/2022 Negative  Negative Final      Leukocyte Esterase Urine 05/23/2022 Trace (A) Negative Final     Bacteria Urine 05/23/2022 None Seen  None Seen /HPF Final     RBC Urine 05/23/2022 0-2  0-2 /HPF /HPF Final     WBC Urine 05/23/2022 0-5  0-5 /HPF /HPF Final

## 2022-05-31 ENCOUNTER — OFFICE VISIT (OUTPATIENT)
Dept: FAMILY MEDICINE | Facility: CLINIC | Age: 52
End: 2022-05-31
Payer: COMMERCIAL

## 2022-05-31 VITALS
OXYGEN SATURATION: 100 % | BODY MASS INDEX: 21.77 KG/M2 | HEIGHT: 64 IN | TEMPERATURE: 97.5 F | SYSTOLIC BLOOD PRESSURE: 113 MMHG | DIASTOLIC BLOOD PRESSURE: 69 MMHG | HEART RATE: 91 BPM | WEIGHT: 127.5 LBS

## 2022-05-31 DIAGNOSIS — N94.10 DYSPAREUNIA IN FEMALE: ICD-10-CM

## 2022-05-31 DIAGNOSIS — E83.59 NEPHROCALCINOSIS: ICD-10-CM

## 2022-05-31 DIAGNOSIS — R31.29 MICROHEMATURIA: Primary | ICD-10-CM

## 2022-05-31 DIAGNOSIS — Z12.11 COLON CANCER SCREENING: ICD-10-CM

## 2022-05-31 DIAGNOSIS — N29 NEPHROCALCINOSIS: ICD-10-CM

## 2022-05-31 DIAGNOSIS — N93.9 VAGINAL BLEEDING: ICD-10-CM

## 2022-05-31 DIAGNOSIS — R80.9 MICROALBUMINURIA: ICD-10-CM

## 2022-05-31 PROCEDURE — 99214 OFFICE O/P EST MOD 30 MIN: CPT | Performed by: FAMILY MEDICINE

## 2022-06-02 ENCOUNTER — LAB (OUTPATIENT)
Dept: LAB | Facility: CLINIC | Age: 52
End: 2022-06-02
Payer: COMMERCIAL

## 2022-06-02 DIAGNOSIS — R80.9 MICROALBUMINURIA: ICD-10-CM

## 2022-06-02 LAB
CREAT UR-MCNC: 18 MG/DL
MICROALBUMIN UR-MCNC: <5 MG/L
MICROALBUMIN/CREAT UR: NORMAL MG/G{CREAT}

## 2022-06-02 PROCEDURE — 82043 UR ALBUMIN QUANTITATIVE: CPT

## 2022-06-06 ENCOUNTER — E-VISIT (OUTPATIENT)
Dept: FAMILY MEDICINE | Facility: CLINIC | Age: 52
End: 2022-06-06
Payer: COMMERCIAL

## 2022-06-06 DIAGNOSIS — L98.9 SKIN LESION: Primary | ICD-10-CM

## 2022-06-06 PROBLEM — E83.59 NEPHROCALCINOSIS: Status: ACTIVE | Noted: 2022-06-06

## 2022-06-06 PROBLEM — N29 NEPHROCALCINOSIS: Status: ACTIVE | Noted: 2022-06-06

## 2022-06-06 PROCEDURE — 99421 OL DIG E/M SVC 5-10 MIN: CPT | Performed by: FAMILY MEDICINE

## 2022-06-06 NOTE — TELEPHONE ENCOUNTER
Called to discuss with pt more.  Redness/swelling lateral to nose, spread a bit and bit more swollen than yesterday, when it just looked like a mosquito bite.  Mildly itchy, no pain, no eye sx's.  Will have her use hydrocortisone cream 1-2x/day for a couple days, and just monitor if sx's are improving.  If sx's are continuing to worsen, should be seen, possibly urgent care tonight.    Provider E-Visit time total (minutes): 9

## 2022-06-06 NOTE — PATIENT INSTRUCTIONS
Thank you for choosing us for your care. Based on your symptoms and length of illness, I do not think that you need a prescription at this time, but as we discussed, you can use some hydrocortisone cream on the area 1-2x/day for a couple days.  If this helps, or the symptoms improve on their own, just continue to monitor the area.  If the swelling/redness worsens, we discussed having you go in to be seen to have it further evaluated.      You can schedule an appointment right here in [facility], or call [phone]  If the visit is for the same symptoms as your eVisit, we'll refund the cost of your eVisit if seen within seven days.

## 2022-06-07 ENCOUNTER — OFFICE VISIT (OUTPATIENT)
Dept: URGENT CARE | Facility: URGENT CARE | Age: 52
End: 2022-06-07
Payer: COMMERCIAL

## 2022-06-07 VITALS
TEMPERATURE: 98.9 F | BODY MASS INDEX: 21.8 KG/M2 | DIASTOLIC BLOOD PRESSURE: 68 MMHG | WEIGHT: 127 LBS | SYSTOLIC BLOOD PRESSURE: 114 MMHG | OXYGEN SATURATION: 100 % | HEART RATE: 84 BPM

## 2022-06-07 DIAGNOSIS — R22.0 FACIAL SWELLING: Primary | ICD-10-CM

## 2022-06-07 DIAGNOSIS — L29.9 ITCHING: ICD-10-CM

## 2022-06-07 DIAGNOSIS — S00.86XA BUG BITE OF FACE WITHOUT INFECTION: ICD-10-CM
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
## 2022-06-07 PROCEDURE — 99213 OFFICE O/P EST LOW 20 MIN: CPT | Performed by: FAMILY MEDICINE

## 2022-06-07 NOTE — PROGRESS NOTES
Chief Complaint   Patient presents with     Facial Swelling     Started Friday. Getting bigger, itchy,        ASSESMENT AND PLAN   Brianna was seen today for facial swelling.    Diagnoses and all orders for this visit:    Facial swelling    Bug bite of face without infection    Itching            Is to continue using the hydrocortisone ointment twice a day  Do Benadryl to help with itching  Also can do cool packs to help with the itching symptoms.      Initial differential diagnosis included but was not restricted to   Differential Diagnosis:  Insect Bite: Insect sting, Cellulitis, Anaphyllaxis, Exagerated local reaction to bite, Hives and Urticaria  Medical Decision Making:  Reviewed with patient that symptoms seem to be related to exaggerated reaction to bug bite.  As there is no sign of cellulitis and no tenderness no further treatment is needed at this point.    Routine discharge counseling was given to the patient and the patient understands that worsening, changing or persistent symptoms should prompt an immediate call or follow up with their primary physician or the emergency department. The importance of appropriate follow up was also discussed with the patient.     I have reviewed the nursing notes.    Time  spent on the date of the encounter doing chart review, patient visit and documentation   see orders in Epic  Pt verbalized and agreed with the plan and is aware of the worsening symptoms for which would need to follow up .  Pt was stable during time of discharge from the clinic     SUBJECTIVE     Brianna Lew is a 51 year old female presenting with a chief complaint of    Chief Complaint   Patient presents with     Facial Swelling     Started Friday. Getting bigger, itchy,      Bite/Sting    Onset of symptoms was 3 day(s) ago.  Course of illness is waxing and waning.    Severity moderate  Location: left face along the lateral aspect of the nose   Context possible bug bite   Current and Associated  symptoms: itching and swelling  Treatment measures tried include: cortisone cream  Relief from treatment: moderate  Significant past medical history: N/A        Past Medical History:   Diagnosis Date     Other abnormal heart sounds     told she doesn't need abx     Unspecified breast disorder     pea-like structure 10 yrs ago, u/s x 2 normal, last one in '95     Current Outpatient Medications   Medication Sig Dispense Refill     NO ACTIVE MEDICATIONS        COMPOUNDED NON-CONTROLLED SUBSTANCE (CMPD RX) - PHARMACY TO MIX COMPOUNDED MEDICATION Estriol 1 mg/g in HRT base, apply small amount to finger and apply to inside vagina daily for 2 weeks then twice weekly (Patient not taking: Reported on 6/7/2022) 30 g 11     Social History     Tobacco Use     Smoking status: Never Smoker     Smokeless tobacco: Never Used   Substance Use Topics     Alcohol use: Yes     Alcohol/week: 0.0 standard drinks     Comment: social, 1 drink or less per day     Family History   Problem Relation Age of Onset     Hematuria Mother         Underwent many tests years ago and never found cause     Hypertension Father      Hematuria Father         Was investgated years ago but not sure if cause was found or treated and resolved with antibiotics     Hearing Loss Father         Can still hear, but using hearing aids     Cerebrovascular Disease Maternal Grandmother 82     Cancer Maternal Grandmother         skin     Heart Disease Maternal Grandmother         Congestive heart failure in 80 s     Cerebrovascular Disease Maternal Grandfather         passed away     Arthritis Maternal Grandfather         Had arthritis, but not sure what kind     Hypertension Paternal Grandmother      Hearing Loss Paternal Grandmother         Could still hear, but hearing impared     Hypertension Paternal Grandfather      Diabetes Paternal Grandfather      C.A.D. Paternal Grandfather 62        MI in early 60s     Enlarged prostate Paternal Grandfather         Biopsy  done, no malignancy found, managing with medication     Heart Disease Paternal Grandfather          of heart attack at 64     Cancer Paternal Uncle 54        pancreatic cancer- passed away at 54     Breast Cancer Maternal Aunt 66     Cancer Paternal Aunt         tongue ca, smoker/etoh     Breast Cancer Maternal Cousin 35        mastectomy, chemo planned     Hematuria Sister         Detected a couple of years ago but was not investigated     Cancer Other         Breast cancer in 70 s     Cancer Other         Pancreatic cancer in 50 s         ROS:    10 point ROS of systems including Constitutional, Eyes, Respiratory, Cardiovascular, Gastroenterology, Genitourinary, Muscularskeletal, Psychiatric ,neurological were all negative except for pertinent positives noted in my HPI         OBJECTIVE:    /68 (BP Location: Right arm, Patient Position: Sitting, Cuff Size: Adult Regular)   Pulse 84   Temp 98.9  F (37.2  C) (Oral)   Wt 57.6 kg (127 lb)   SpO2 100%   BMI 21.80 kg/m    GENERAL APPEARANCE: healthy, alert and no distress  EYES: EOMI,  PERRL, conjunctiva clear  HENT: ear canals and TM's normal.  Nose and mouth without ulcers, erythema or lesions  CV: regular rates and rhythm, normal S1 S2, no murmur noted  SKIN: erythematous  raised macular lesion measuring 1.5 cm  noted over the lateral aspect of the nose on the left side and there is some swelling noted in the area in the upper cheek on the left side just below the left eye with no tenderness or no sign of cellulitis.  PSYCH: mentation appears normal  Physical Exam      (Note was completed, in part, with [employer] voice-recognition software. Documentation reviewed, but some grammatical, spelling, and word errors may remain.)  Ronna Mcbride MD.now.today

## 2022-06-23 ENCOUNTER — TELEPHONE (OUTPATIENT)
Dept: UROLOGY | Facility: CLINIC | Age: 52
End: 2022-06-23

## 2022-06-23 ENCOUNTER — MYC MEDICAL ADVICE (OUTPATIENT)
Dept: UROLOGY | Facility: CLINIC | Age: 52
End: 2022-06-23

## 2022-06-23 NOTE — TELEPHONE ENCOUNTER
COMPOUNDED NON-CONTROLLED SUBSTANCE (CMPD RX) - PHARMACY TO MIX COMPOUNDED MEDICATION   COMPOUNDED NON-CONTROLLED SUBSTANCE (CMPD RX) - PHARMACY TO MIX COMPOUNDED MEDICATION     Last Written Prescription Date: 3/3/21  Last Fill Quantity: 30g,   # refills: 11  Last Office Visit : 6/9/21  Future Office visit:  None    Scheduling has been notified to contact the pt for appointment.      Routing refill request to provider for review/approval because: my chart request. Says non controlled but pop up says controlled. Cant pull up med.  Thanks.

## 2022-06-30 DIAGNOSIS — N95.2 ATROPHIC VAGINITIS: ICD-10-CM

## 2022-06-30 NOTE — TELEPHONE ENCOUNTER
Patient is scheduled to see Dr Cabrera next month  Sent one refill    Libra Qiu, RN, BSN  Care Coordinator Urology

## 2022-07-06 DIAGNOSIS — N95.2 ATROPHIC VAGINITIS: Primary | ICD-10-CM

## 2022-07-06 RX ORDER — ESTRADIOL 0.1 MG/G
2 CREAM VAGINAL
Qty: 42.5 G | Refills: 11 | Status: SHIPPED | OUTPATIENT
Start: 2022-07-07 | End: 2023-09-27

## 2022-07-29 ENCOUNTER — TELEPHONE (OUTPATIENT)
Dept: UROLOGY | Facility: CLINIC | Age: 52
End: 2022-07-29

## 2022-08-08 ENCOUNTER — HOSPITAL ENCOUNTER (OUTPATIENT)
Facility: CLINIC | Age: 52
End: 2022-08-08
Attending: COLON & RECTAL SURGERY | Admitting: COLON & RECTAL SURGERY
Payer: COMMERCIAL

## 2022-08-08 ENCOUNTER — TELEPHONE (OUTPATIENT)
Dept: GASTROENTEROLOGY | Facility: CLINIC | Age: 52
End: 2022-08-08

## 2022-08-08 DIAGNOSIS — Z12.11 ENCOUNTER FOR SCREENING COLONOSCOPY: Primary | ICD-10-CM

## 2022-08-08 NOTE — TELEPHONE ENCOUNTER
Screening Questions    BlueKIND OF PREP RedLOCATION [review exclusion criteria] GreenSEDATION TYPE      1. Are you active on mychart? Y    2. What insurance is in the chart? [employer]     3.   Ordering/Referring Provider: Nia Chavez MD       4. BMI   (If greater than 40 review exclusion criteria [PAC APPT IF [MAC] @ [facility])  21.8  [If yes, BMI OVER 40-EXTENDED PREP]      **(Sedation review/consideration needed)**  Do you have a legal guardian or Medical Power of    and/or are you able to give consent for your medical care?     Y    5. Have you had a positive covid test in the last 90 days?   N -     6.  Are you currently on dialysis?   N [ If yes, G-PREP & HOSPITAL setting ONLY]     7.  Do you have chronic kidney disease?  N [ If yes, G-PREP ]    8.   Do you have a diagnosis of diabetes?   N   [ If yes, G-PREP ]    9.  On a regular basis do you go 3-5 days between bowel movements?   N   [ If yes, EXTENDED PREP]    10.  Are you taking any prescription pain medications on a routine schedule?    N -  [ If yes, EXTENDED PREP] [If yes, MAC]      11.   Do you have any chemical dependencies such as alcohol, street drugs, or methadone?    N [If yes, MAC]    12.   Do you have any history of post-traumatic stress syndrome, severe anxiety or history of psychosis?    N  [If yes, MAC]    13.  [FEMALES] Are you currently pregnant? N    If yes, how many weeks?       Respiratory/Heart Screening:  [If yes to any of the following HOSPITAL setting only]     14. Do you have Pulmonary Hypertension [Lungs]?   N       15. Do you have UNCONTROLLED asthma?   N     16.  Do you use daily home oxygen?  N      17. Do you have mod to severe Obstructive Sleep Apnea?         (OKAY @ [facility]  [facility]  SH  PH  RI  MG - if pt is not on OXYGEN)  N      18.   Have you had a heart or lung transplant?   N      19.   Have you had a stroke or Transient ischemic attack (TIA - aka  mini stroke ) within 6 months?  (If yes, please review  exclusion criteria)  N     20.   In the past 6 months, have you had any heart related issues including cardiomyopathy or heart attack?   N           If yes, did it require cardiac stenting or other implantable device?   NA      21.   Do you have any implantable devices in your body (pacemaker, defib, LVAD)? (If yes, please review exclusion criteria)  N     22.  Do you take the medication Phentermine?  NO        23. Do you take nitroglycerin?   N           If yes, how often? NA  (if yes, HOSPITAL setting ONLY)    24.  Are you currently taking any blood thinners?    [If yes, INFORM patient to follw  w/ ORDERING PROVIDER FOR BRIDGING INSTRUCTIONS]     N    25.   Do you transfer independently?                (If NO, please HOSPITAL setting ONLY)  Y    26.   Preferred LOCAL Pharmacy for Pre Prescription:         [id number] [place], [place] - [address] AT [facility] [address]      Scheduling Details  (Please ask for phone number if not scheduled by patient)      Caller : Brianna Lew    Date of Procedure: 9/15  Surgeon: Tor  Location:         Sedation Type: CS l Per Protocol  Conscious Sedation- Needs  for 6 hours after the procedure  MAC/General-Needs  for 24 hours after procedure    N :[Pre-op Required] at [facility]  MG and OR for MAC sedation   (advise patient they will need a pre-op WITH IN 30 DAYS of procedure date)     Type of Procedure Scheduled:   Lower Endoscopy [Colonoscopy]    Which Colonoscopy Prep was Sent?:   Golytely - Magnesium Citrate recall    KHORUTS CF PATIENTS & GROEN'S PATIENTS NEEDS EXTENDED PREP       Informed patient they will need an adult  Y  Cannot take any type of public or medical transportation alone    Pre-Procedure Covid test to be completed at [facility] or Externally: Y  **INFORMED OF HOME TESTING & LAB OPTION**        Confirmed Nurse will call to complete assessment Y    Additional comments: Will speak to primary to  see if where she needs to be seen. Order states [facility] team. Will cancel appointment on 9/15 if she needs to be seen with their team.

## 2022-08-10 ENCOUNTER — PRE VISIT (OUTPATIENT)
Dept: UROLOGY | Facility: CLINIC | Age: 52
End: 2022-08-10

## 2022-08-10 NOTE — TELEPHONE ENCOUNTER
Reason for visit: med refill     Dx/Hx/Sx: atrophic vaginitis     Records/imaging/labs/orders: in epic    At Rooming: video visit

## 2022-09-19 ENCOUNTER — TELEPHONE (OUTPATIENT)
Dept: FAMILY MEDICINE | Facility: CLINIC | Age: 52
End: 2022-09-19

## 2022-09-19 NOTE — TELEPHONE ENCOUNTER
"CW,      General Call      Patient called.  States she has an Apple Watch.  Was looking at information/readings in the watch  Noticed on the watch over past year she has had times when \"oxygen level\" had read down into the mid to upper 80's.    Most recent this past weekend was 86% around 12:30 am, sleeping.  Level mostly .    Patient is a runner.  States her ex- and ex-boyfriend has never told her she stops breathing while sleeping.    \"I don't know how accurate these watches are\"    Informed patient you are out of the office today and will address sometime tomorrow.    Thank you,  Brandie Melchor RN        "

## 2022-09-21 NOTE — TELEPHONE ENCOUNTER
Called and discussed...  Very low risk for sleep apnea, no sx's of sleep apnea.  If sx's of sleep apnea occur (daytime sleepiness, am headaches, nodding off, increased snoring), would then watch for night time O2 dips, but otherwise would ignore.  Pt agrees with plan.  CW

## 2022-10-26 ENCOUNTER — VIRTUAL VISIT (OUTPATIENT)
Dept: UROLOGY | Facility: CLINIC | Age: 52
End: 2022-10-26
Payer: COMMERCIAL

## 2022-10-26 VITALS — WEIGHT: 122 LBS | HEIGHT: 64 IN | BODY MASS INDEX: 20.83 KG/M2

## 2022-10-26 DIAGNOSIS — N95.2 VAGINAL ATROPHY: Primary | ICD-10-CM

## 2022-10-26 PROCEDURE — 99214 OFFICE O/P EST MOD 30 MIN: CPT | Mod: 95 | Performed by: PHYSICIAN ASSISTANT

## 2022-10-26 ASSESSMENT — PAIN SCALES - GENERAL: PAINLEVEL: NO PAIN (0)

## 2022-10-26 NOTE — LETTER
10/26/2022       RE: Brianna Lew  [address]  [facility] [address]     Dear Colleague,    Thank you for referring your patient, Brianna Lew, to the [facility]OLOG[facility] at [facility]. Please see a copy of my visit note below.    SEND LINK TO CELL PHONE    Brianna is a 51 year old who is being evaluated via a billable video visit.      How would you like to obtain your AVS? MyChart  If the video visit is dropped, the invitation should be resent by: Text to cell phone: [phone]  Will anyone else be joining your video visit? No        Video-Visit Details    Video Start Time: 3:02 PM    Type of service:  Video Visit    Video End Time:3:12 PM    Originating Location (pt. Location): Home        Distant Location (provider location):  On-site    Platform used for Video Visit: AmWell        October 26, 2022  Reason for Visit: follow-up    Subjective:   Brianna Lew is a 51 year old female with a hx of microhematuria and dyspareunia. She was last seen by Dr. Cabrera on 6/9/21 for completion of hematuria work-up with cysto.Hematuria workup was unremarkable except for CT findings that showed nephrocalcinosis.  Was referred to nephrology for CT findings. Saw nephrology in 8/2021 who concluded her hematuria was benign and recommend UA and renal function every 6 months and to follow back with nephrology if kidney function changes or proteinuria occurs. States her PCP has been managing continuing to follow her UA and renal function eery 6 months. Last UA in 5/2022 and negative for hematuria and protein. No significant changes in renal function on labs from 5/2022. Denies flank pain, gross hematurea, Abhi, bladder symptoms. Last encounter was referred to PFPT and advised to start estrogen cream for her dyspareunia. Using estrogen cream 2x/week. Of note, was seen by OB/GYN in 12/2021 for PMB with TVUS, and concluded that likely from  vaginal atrophy and ok to resume estrogen cream. Did not go back to [facility];  and not currently sexually active. Patient voices no other concerns.       Objective:  General: A&Ox3 in no acute distress  Psych: normal affect. Pleasant mood.   HEENT: head normocephalic. EOMI. Conjunctiva clear. Normal ROM of neck. No obvious tracheal deviation.  Resp: Able to talk in full sentences. No obvious retractions. Chest rises symmetrically with breathing. No obvious cyanosis. No auditory wheezes, crackles. Does not appear SOB.    Labs/imaging:  UA RESULTS:  Recent Labs   Lab Test 05/23/22  0430   COLOR Yellow   APPEARANCE Clear   URINEGLC Negative   URINEBILI Negative   URINEKETONE Negative   SG 1.010   UBLD Moderate*   URINEPH 6.0   PROTEIN Negative   UROBILINOGEN 0.2   NITRITE Negative   LEUKEST Trace*   RBCU 0-2   WBCU 0-5     Last Comprehensive Metabolic Panel:  Sodium   Date Value Ref Range Status   05/23/2022 134 133 - 144 mmol/L Final   02/17/2021 139 133 - 144 mmol/L Final     Potassium   Date Value Ref Range Status   05/23/2022 3.8 3.4 - 5.3 mmol/L Final   02/17/2021 4.0 3.4 - 5.3 mmol/L Final     Chloride   Date Value Ref Range Status   05/23/2022 101 94 - 109 mmol/L Final   02/17/2021 106 94 - 109 mmol/L Final     Carbon Dioxide   Date Value Ref Range Status   02/17/2021 26 20 - 32 mmol/L Final     Carbon Dioxide (CO2)   Date Value Ref Range Status   05/23/2022 30 20 - 32 mmol/L Final     Anion Gap   Date Value Ref Range Status   05/23/2022 3 3 - 14 mmol/L Final   02/17/2021 7 3 - 14 mmol/L Final     Glucose   Date Value Ref Range Status   05/23/2022 100 (H) 70 - 99 mg/dL Final   02/17/2021 101 (H) 70 - 99 mg/dL Final     Comment:     Fasting specimen     Urea Nitrogen   Date Value Ref Range Status   05/23/2022 12 7 - 30 mg/dL Final   02/17/2021 15 7 - 30 mg/dL Final     Creatinine   Date Value Ref Range Status   05/23/2022 0.66 0.52 - 1.04 mg/dL Final   02/17/2021 0.77 0.52 - 1.04 mg/dL Final     GFR Estimate    Date Value Ref Range Status   05/23/2022 >90 >60 mL/min/1.73m2 Final     Comment:     Effective December 21, 2021 eGFRcr in adults is calculated using the 2021 CKD-EPI creatinine equation which includes age and gender (Elinor flores al., NEJ, DOI: 10.1056/ZDOEwu1583394)   02/17/2021 >90 >60 mL/min/[1.73_m2] Final     Comment:     Non  GFR Calc  Starting 12/18/2018, serum creatinine based estimated GFR (eGFR) will be   calculated using the Chronic Kidney Disease Epidemiology Collaboration   (CKD-EPI) equation.       Calcium   Date Value Ref Range Status   05/23/2022 9.1 8.5 - 10.1 mg/dL Final   02/17/2021 9.6 8.5 - 10.1 mg/dL Final     Urine cytology 3/8/21:  NEM    CT urogram reviewed today 3/9/21:  IMPRESSION: No evidence of urinary tract obstruction, stone or mass.   1.  Scattered ill-defined hyperdense foci are noted in the left kidney  in the region of the medulla. There are no features of papillary  necrosis. They are not as dense as calculi but may represent mild form  of medullary nephrocalcinosis although it is atypical to be  unilateral. Alternatively it could be concentrated urine salts in the  setting of dehydration.   2.  Benign hepatic hemangioma.    Cystoscopy procedure 6/9/21:  Pt. Was consented and placed in the lithotomy position.  She was cleaned and preparred in the usual fashion.  Lidocain gel was inserted into the urethra and given time to take effect.  A 16 fr flexible cystoscope was then inserted through the urethra and into the bladder.  The urethra was wnl.  The bladder was with 1+ trabeculation.  No tumors, diverticulae, or stones.  Bilateral u/o's were effluxing clear urine.  The cystoscope was then withdrawn.  The pt. Tolerated the procedure well.    There are no diagnoses linked to this encounter.       A/P:   50 y/o female with asymptomatic microhematuria with negative w/u except for some nephrocalcinosis and dyspareunia.      - Continue estrogen cream 2x/week; can have  PCP refill.   - Continue to follow with PCP for UA and renal function labs every 6 months. If develops gross hematuria or worsening microscopic hematuria, advise to f/u with urology.  - f/u prn.     Josie Mcqueen PA-C  Urology    15 minutes were spent today on the day of the encounter in reviewing the EMR, direct patient care, coordination of care and documentation    CC  Patient Care Team:  Nia Chavez MD as PCP - General  Nia Chavez MD as Assigned PCP  Estee Cabrera MD as MD (Urology)  Nia Chavez MD as Referring Physician (Family Medicine)  Estee Cabrera MD as Assigned Surgical Provider  Alisa Jensen MD as MD (Nephrology)  Rossy Kay MD as Assigned OBGYN Provider

## 2022-10-26 NOTE — PROGRESS NOTES
SEND LINK TO CELL PHONE    Brianna is a 51 year old who is being evaluated via a billable video visit.      How would you like to obtain your AVS? MyChart  If the video visit is dropped, the invitation should be resent by: Text to cell phone: [phone]  Will anyone else be joining your video visit? No        Video-Visit Details    Video Start Time: 3:02 PM    Type of service:  Video Visit    Video End Time:3:12 PM    Originating Location (pt. Location): Home        Distant Location (provider location):  On-site    Platform used for Video Visit: Yovigo        October 26, 2022  Reason for Visit: follow-up    Subjective:   Brianna Lew is a 51 year old female with a hx of microhematuria and dyspareunia. She was last seen by Dr. Cabrera on 6/9/21 for completion of hematuria work-up with cysto.Hematuria workup was unremarkable except for CT findings that showed nephrocalcinosis.  Was referred to nephrology for CT findings. Saw nephrology in 8/2021 who concluded her hematuria was benign and recommend UA and renal function every 6 months and to follow back with nephrology if kidney function changes or proteinuria occurs. States her PCP has been managing continuing to follow her UA and renal function eery 6 months. Last UA in 5/2022 and negative for hematuria and protein. No significant changes in renal function on labs from 5/2022. Denies flank pain, gross hematurea, Abhi, bladder symptoms. Last encounter was referred to PFPT and advised to start estrogen cream for her dyspareunia. Using estrogen cream 2x/week. Of note, was seen by OB/GYN in 12/2021 for PMB with TVUS, and concluded that likely from vaginal atrophy and ok to resume estrogen cream. Did not go back to PFPT;  and not currently sexually active. Patient voices no other concerns.       Objective:  General: A&Ox3 in no acute distress  Psych: normal affect. Pleasant mood.   HEENT: head normocephalic. EOMI. Conjunctiva clear. Normal ROM of neck. No  obvious tracheal deviation.  Resp: Able to talk in full sentences. No obvious retractions. Chest rises symmetrically with breathing. No obvious cyanosis. No auditory wheezes, crackles. Does not appear SOB.    Labs/imaging:  UA RESULTS:  Recent Labs   Lab Test 05/23/22  0430   COLOR Yellow   APPEARANCE Clear   URINEGLC Negative   URINEBILI Negative   URINEKETONE Negative   SG 1.010   UBLD Moderate*   URINEPH 6.0   PROTEIN Negative   UROBILINOGEN 0.2   NITRITE Negative   LEUKEST Trace*   RBCU 0-2   WBCU 0-5     Last Comprehensive Metabolic Panel:  Sodium   Date Value Ref Range Status   05/23/2022 134 133 - 144 mmol/L Final   02/17/2021 139 133 - 144 mmol/L Final     Potassium   Date Value Ref Range Status   05/23/2022 3.8 3.4 - 5.3 mmol/L Final   02/17/2021 4.0 3.4 - 5.3 mmol/L Final     Chloride   Date Value Ref Range Status   05/23/2022 101 94 - 109 mmol/L Final   02/17/2021 106 94 - 109 mmol/L Final     Carbon Dioxide   Date Value Ref Range Status   02/17/2021 26 20 - 32 mmol/L Final     Carbon Dioxide (CO2)   Date Value Ref Range Status   05/23/2022 30 20 - 32 mmol/L Final     Anion Gap   Date Value Ref Range Status   05/23/2022 3 3 - 14 mmol/L Final   02/17/2021 7 3 - 14 mmol/L Final     Glucose   Date Value Ref Range Status   05/23/2022 100 (H) 70 - 99 mg/dL Final   02/17/2021 101 (H) 70 - 99 mg/dL Final     Comment:     Fasting specimen     Urea Nitrogen   Date Value Ref Range Status   05/23/2022 12 7 - 30 mg/dL Final   02/17/2021 15 7 - 30 mg/dL Final     Creatinine   Date Value Ref Range Status   05/23/2022 0.66 0.52 - 1.04 mg/dL Final   02/17/2021 0.77 0.52 - 1.04 mg/dL Final     GFR Estimate   Date Value Ref Range Status   05/23/2022 >90 >60 mL/min/1.73m2 Final     Comment:     Effective December 21, 2021 eGFRcr in adults is calculated using the 2021 CKD-EPI creatinine equation which includes age and gender (Elinor et al., NEJM, DOI: 10.1056/QUIZme4701952)   02/17/2021 >90 >60 mL/min/[1.73_m2] Final      Comment:     Non  GFR Calc  Starting 12/18/2018, serum creatinine based estimated GFR (eGFR) will be   calculated using the Chronic Kidney Disease Epidemiology Collaboration   (CKD-EPI) equation.       Calcium   Date Value Ref Range Status   05/23/2022 9.1 8.5 - 10.1 mg/dL Final   02/17/2021 9.6 8.5 - 10.1 mg/dL Final     Urine cytology 3/8/21:  NEM    CT urogram reviewed today 3/9/21:  IMPRESSION: No evidence of urinary tract obstruction, stone or mass.   1.  Scattered ill-defined hyperdense foci are noted in the left kidney  in the region of the medulla. There are no features of papillary  necrosis. They are not as dense as calculi but may represent mild form  of medullary nephrocalcinosis although it is atypical to be  unilateral. Alternatively it could be concentrated urine salts in the  setting of dehydration.   2.  Benign hepatic hemangioma.    Cystoscopy procedure 6/9/21:  Pt. Was consented and placed in the lithotomy position.  She was cleaned and preparred in the usual fashion.  Lidocain gel was inserted into the urethra and given time to take effect.  A 16 fr flexible cystoscope was then inserted through the urethra and into the bladder.  The urethra was wnl.  The bladder was with 1+ trabeculation.  No tumors, diverticulae, or stones.  Bilateral u/o's were effluxing clear urine.  The cystoscope was then withdrawn.  The pt. Tolerated the procedure well.    There are no diagnoses linked to this encounter.       A/P:   50 y/o female with asymptomatic microhematuria with negative w/u except for some nephrocalcinosis and dyspareunia.      - Continue estrogen cream 2x/week; can have PCP refill.   - Continue to follow with PCP for UA and renal function labs every 6 months. If develops gross hematuria or worsening microscopic hematuria, advise to f/u with urology.  - f/u prn.     Josie Mcqueen PA-C  Urology    15 minutes were spent today on the day of the encounter in reviewing the EMR, direct  patient care, coordination of care and documentation    CC  Patient Care Team:  Nia Chavez MD as PCP - General  Nia Chavez MD as Assigned PCP  Estee Cabrera MD as MD (Urology)  Nia Chavez MD as Referring Physician (Family Medicine)  Estee Cabrera MD as Assigned Surgical Provider  Alisa Jensen MD as MD (Nephrology)  Rossy Kay MD as Assigned OBGYN Provider

## 2022-10-30 ENCOUNTER — HEALTH MAINTENANCE LETTER (OUTPATIENT)
Age: 52
End: 2022-10-30

## 2022-11-21 ENCOUNTER — TELEPHONE (OUTPATIENT)
Dept: FAMILY MEDICINE | Facility: CLINIC | Age: 52
End: 2022-11-21

## 2022-11-21 DIAGNOSIS — R80.9 MICROALBUMINURIA: ICD-10-CM

## 2022-11-21 DIAGNOSIS — R31.29 MICROHEMATURIA: Primary | ICD-10-CM

## 2022-11-21 NOTE — TELEPHONE ENCOUNTER
CW,   Patient calling about lab orders  Scheduled f/u with you - soonest she could get was 2/14/2022  Nervous about going that long for kidney testing though   States you advised lab testing every 6 months - due now in November   Please advise on lab orders and if ok to do closer to appt or if essential to do now instead  Pended possible orders  Thanks!  Jayla OLIVIER RN

## 2022-12-16 ENCOUNTER — LAB (OUTPATIENT)
Dept: LAB | Facility: CLINIC | Age: 52
End: 2022-12-16
Payer: COMMERCIAL

## 2022-12-16 DIAGNOSIS — R80.9 MICROALBUMINURIA: ICD-10-CM

## 2022-12-16 DIAGNOSIS — R31.29 MICROHEMATURIA: ICD-10-CM

## 2022-12-16 LAB
ALBUMIN UR-MCNC: NEGATIVE MG/DL
ANION GAP SERPL CALCULATED.3IONS-SCNC: 12 MMOL/L (ref 7–15)
APPEARANCE UR: CLEAR
BILIRUB UR QL STRIP: NEGATIVE
BUN SERPL-MCNC: 13 MG/DL (ref 6–20)
CALCIUM SERPL-MCNC: 9.5 MG/DL (ref 8.6–10)
CHLORIDE SERPL-SCNC: 101 MMOL/L (ref 98–107)
COLOR UR AUTO: YELLOW
CREAT SERPL-MCNC: 0.8 MG/DL (ref 0.51–0.95)
CREAT UR-MCNC: 20.7 MG/DL
DEPRECATED HCO3 PLAS-SCNC: 25 MMOL/L (ref 22–29)
GFR SERPL CREATININE-BSD FRML MDRD: 89 ML/MIN/1.73M2
GLUCOSE SERPL-MCNC: 100 MG/DL (ref 70–99)
GLUCOSE UR STRIP-MCNC: NEGATIVE MG/DL
HGB UR QL STRIP: ABNORMAL
KETONES UR STRIP-MCNC: NEGATIVE MG/DL
LEUKOCYTE ESTERASE UR QL STRIP: NEGATIVE
MICROALBUMIN UR-MCNC: <12 MG/L
MICROALBUMIN/CREAT UR: NORMAL MG/G{CREAT}
NITRATE UR QL: NEGATIVE
PH UR STRIP: 7 [PH] (ref 5–7)
POTASSIUM SERPL-SCNC: 4.1 MMOL/L (ref 3.4–5.3)
RBC #/AREA URNS AUTO: ABNORMAL /HPF
SODIUM SERPL-SCNC: 138 MMOL/L (ref 136–145)
SP GR UR STRIP: 1.01 (ref 1–1.03)
UROBILINOGEN UR STRIP-ACNC: 0.2 E.U./DL
WBC #/AREA URNS AUTO: ABNORMAL /HPF

## 2022-12-16 PROCEDURE — 81001 URINALYSIS AUTO W/SCOPE: CPT

## 2022-12-16 PROCEDURE — 36415 COLL VENOUS BLD VENIPUNCTURE: CPT

## 2022-12-16 PROCEDURE — 82043 UR ALBUMIN QUANTITATIVE: CPT

## 2022-12-16 PROCEDURE — 80048 BASIC METABOLIC PNL TOTAL CA: CPT

## 2022-12-17 NOTE — RESULT ENCOUNTER NOTE
-Your basic metabolic panel (which includes electrolyte levels, blood sugar level and kidney function tests) is normal.  -Your microalbumin level (which is the urine test that can signal signs of early chronic kidney disease if elevated to >30) is very low which is good.  -Your urine tests showed a small amount of red blood cells.  I reviewed the recent urology consult notes, and they said to return if you could see blood in your urine, so this level would likely not necessitate a return to them.  We can talk about this further at your 2/23 appointment.    Gulshan Turner MD

## 2022-12-29 ENCOUNTER — TELEPHONE (OUTPATIENT)
Dept: FAMILY MEDICINE | Facility: CLINIC | Age: 52
End: 2022-12-29

## 2022-12-29 NOTE — TELEPHONE ENCOUNTER
Patient called.  States she tested positive for Covid Sunday 12/25  Symptoms stared beginning of last week with scratchy throat.  Throat started hurting more on Friday 12/23.    Symptoms > 5 days ago.  Patient states symptoms are gone now.  Asking if ok to see her relatives next weekend  Wondering if she should do another home Covid test.    Advised patient she can do another test to see if negative but can still test positive for few weeks after.    Advised wearing mask around any immunocompromised people in general to avoid spreading any illness. (Flu, RSV, common cold etc)  Good handwashing.    Patient verbalizes understanding.  Denies any further questions  Brandie Melchor RN

## 2023-01-05 ENCOUNTER — MYC MEDICAL ADVICE (OUTPATIENT)
Dept: FAMILY MEDICINE | Facility: CLINIC | Age: 53
End: 2023-01-05

## 2023-01-05 NOTE — TELEPHONE ENCOUNTER
"Agree most likely viral rash.  Can use over the counter antihistamines if itchy/uncomfrotable, but if not bothering her, ok to continue to monitor.  Checking an at home COVID test for \"contagious state\" may be helpful/reassuring too.    Thanks  Mi \"Aneudy\" CARLTON Crockett   "

## 2023-01-05 NOTE — TELEPHONE ENCOUNTER
DULCE MARIA (JELENA),  Please see below [employer] message and advise.  Thanks,  Debi SANDERSON RN

## 2023-01-09 NOTE — TELEPHONE ENCOUNTER
See Mychart message.    Patient called.  Asked patient to do home Covid test and send [employer] message with result.    Brandie Melchor RN

## 2023-01-11 ENCOUNTER — TELEPHONE (OUTPATIENT)
Dept: FAMILY MEDICINE | Facility: CLINIC | Age: 53
End: 2023-01-11

## 2023-01-11 ENCOUNTER — VIRTUAL VISIT (OUTPATIENT)
Dept: FAMILY MEDICINE | Facility: CLINIC | Age: 53
End: 2023-01-11
Payer: COMMERCIAL

## 2023-01-11 DIAGNOSIS — R21 RASH ASSOCIATED WITH COVID-19: Primary | ICD-10-CM

## 2023-01-11 DIAGNOSIS — R21 MORBILLIFORM RASH: ICD-10-CM

## 2023-01-11 DIAGNOSIS — U07.1 RASH ASSOCIATED WITH COVID-19: Primary | ICD-10-CM

## 2023-01-11 PROCEDURE — 99213 OFFICE O/P EST LOW 20 MIN: CPT | Mod: CS | Performed by: FAMILY MEDICINE

## 2023-01-11 NOTE — PROGRESS NOTES
Brianna is a 52 year old who is being evaluated via a billable video visit.      How would you like to obtain your AVS? MyChart  If the video visit is dropped, the invitation should be resent by: Text to cell phone: [phone]  Will anyone else be joining your video visit? No        Assessment & Plan       ICD-10-CM    1. Rash associated with COVID-19  U07.1     R21       2. Morbilliform rash  R21          COVID19 infection - tested positive 12/25/22, sx's for a few days prior.  Still with mild cough/congestion.  Rash started ~2 weeks ago, ~12/29/22 and got much more extensive this past week.  Fatigue on Monday after cross-country skiing a few hrs Sat/Sun, but energy has been better yesterday and today.  Able to see rash on pictures and video today- extensive on torso, consistent with most common post-covid rash, no concerning areas.      Discussed given reassuring exam today and mostly improving sx's, does not need to be seen in person at this point.  If worsening/changing/concerning sx's, should be seen.  Would try more gentle exercise and see how next day goes, okay if she's tired in evening.  Trip planned to [place] next week- should be fine to go, but may lessen hike lengths if still having issues with exercise.  Use antihistamines if rash becomes more itchy.      I spent a total of 25 minutes on the day of the visit.   Time spent doing chart review, history and exam, documentation and further activities per the note      Nia Chavez MD  [facility]        Subjective   Brianna is a 52 year old, presenting for the following health issues:  Derm Problem (Rash after COVID)      HPI     Rash  Onset/Duration: noticed red spots 2 weeks ago (~29th)  Description  Location: red spots on torso, back, neck, arms (little bit), upper thighs (little bit)  Character: raised, red  Itching: mild  Intensity:  mild  Progression of Symptoms:  worsening  Accompanying signs and symptoms:   Fever: No  Body  aches or joint pain: No  Sore throat symptoms: No  Recent cold symptoms: YES- mild remaining cough from COVID (tested positive for COVID on 12/25)  History:           Previous episodes of similar rash: None  New exposures:  None  Recent travel: No  Exposure to similar rash: No  Precipitating or alleviating factors: rash developed just after COVID infection. Pt also notes increased fatigue and issues sleeping. Pt also notes increased feelings of anxiety.   Therapies tried and outcome: none    COVID- tested positive 12/25/22.  Sx's a few days, but tests were neg.  Sx's- cough and congestion, bit of a sore throat.  Mild cough and congestion have continued.    Spots showed up a couple weeks ago- few, flat, reddish.  First noticed 12/30/22- after a hot bath.  epson salt bath, trying to help her sinuses.  Noticed her thighs were red and itchy afterwards so showed afterwards.  Noticed the spots after the bath- may have been there prior.  Gradually got more and more skin spots.    Some of the bigger spots were there, some were fading and crusting.  Then got bunches of smaller spots,     Whole torso is covered now- front and back, and neck is also now getting some, tiny bit on arms and inner thighs.    Unsure if she's still getting more yesterday/today.    Didn't itch at first, now itches a bit, bit more yesterday, less today.  Not as bad that she wanted to take the antihistamine, but she's not wearing a bra, wearing soft clothes.      Other sx's- had bad fatigue on Monday.  Went [place]-skiing Sat and Sunday, and felt great doing it, outside a couple hrs.  Crashed at 8pm on Sat, and similar on Sun.    Keeps waking up middle of the night and is up a couple hours.  Monday, really tired.  Suspects a mix from exercise and lack of sleep.  Feels much better today and yesterday- no other sx's.    Hasn't exercised since Sun, paranoid that it's making her worse, which makes her sad because 'that's how she maintains her  sanity'.        Review of Systems   Constitutional, HEENT, cardiovascular, pulmonary, gi and gu systems are negative, except as otherwise noted.          Objective     Vitals:  No vitals were obtained today due to virtual visit.    Physical Exam   GENERAL: Healthy, alert and no distress  EYES: Eyes grossly normal to inspection.  No discharge or erythema, or obvious scleral/conjunctival abnormalities.  RESP: No audible wheeze, cough, or visible cyanosis.  No visible retractions or increased work of breathing.    SKIN: Visible skin clear. No significant rash, abnormal pigmentation or lesions.  NEURO: Cranial nerves grossly intact.  Mentation and speech appropriate for age.  PSYCH: Mentation appears normal, affect normal/bright, judgement and insight intact, normal speech and appearance well-groomed.            Video-Visit Details    Joined the call at 1/11/2023, 2:12:08 pm.  Left the call at 1/11/2023, 2:29:49 pm.  You were on the call for 17 minutes 40 seconds     Type of service:  Video Visit     Originating Location (pt. Location): Home  Distant Location (provider location):  On-site  Platform used for Video Visit: Coy

## 2023-01-11 NOTE — TELEPHONE ENCOUNTER
Fariha Norwood LSigned  8:24 AM    Addend                    Reason for Call:  Appointment Request     Patient requesting this type of appt:  rash that deleloped after having covid fatigue     Requested provider: Nia Chavez     Reason patient unable to be scheduled: Not within requested timeframe     When does patient want to be seen/preferred time: Same day     Comments: rash that deleloped after having covid fatigue     Could we send this information to you in [employer] [place] or would you prefer to receive a phone call?:   Patient would prefer a phone call   Okay to leave a detailed message?: Yes at Cell number on file:        Telephone Information:   Mobile [phone]         Call taken on 1/11/2023 at 8:24 AM by Fariha Norwood

## 2023-01-11 NOTE — TELEPHONE ENCOUNTER
Reason for Call:  Appointment Request    Patient requesting this type of appt:  rash that deleloped after having covid fatigue    Requested provider: Nia Chavez    Reason patient unable to be scheduled: Not within requested timeframe    When does patient want to be seen/preferred time: Same day    Comments: rash that deleloped after having covid fatigue    Could we [facility] this information to you [facility] or would you prefer to receive a phone call?:   Patient would prefer a phone call   Okay to leave a detailed message?: Yes at Cell number on file:    Telephone Information:   Mobile [phone]       Call taken on 1/11/2023 at 8:24 AM by Fariha Norwood

## 2023-02-13 ENCOUNTER — TELEPHONE (OUTPATIENT)
Dept: FAMILY MEDICINE | Facility: CLINIC | Age: 53
End: 2023-02-13
Payer: COMMERCIAL

## 2023-02-13 ASSESSMENT — ENCOUNTER SYMPTOMS
SHORTNESS OF BREATH: 0
SORE THROAT: 0
PARESTHESIAS: 0
NAUSEA: 0
HEMATOCHEZIA: 0
ARTHRALGIAS: 0
DYSURIA: 0
BREAST MASS: 0
FEVER: 0
WEAKNESS: 0
FREQUENCY: 0
HEARTBURN: 0
CHILLS: 0
MYALGIAS: 0
CONSTIPATION: 0
COUGH: 0
ABDOMINAL PAIN: 0
EYE PAIN: 0
DIARRHEA: 0
HEADACHES: 0
PALPITATIONS: 0
NERVOUS/ANXIOUS: 0
HEMATURIA: 0
JOINT SWELLING: 0
DIZZINESS: 0

## 2023-02-13 NOTE — TELEPHONE ENCOUNTER
CW,      Patient called.  Scheduled to see you for her physical early tomorrow.  Asking if she needs to be fasting for labs.    Lipids last done 5/2022, other labs done 12/2022.    Thanks,  Brandie Melchor RN      Triage note: call patient back and let her know if she needs to be fasting or not  [phone]

## 2023-02-14 ENCOUNTER — OFFICE VISIT (OUTPATIENT)
Dept: FAMILY MEDICINE | Facility: CLINIC | Age: 53
End: 2023-02-14
Payer: COMMERCIAL

## 2023-02-14 VITALS
SYSTOLIC BLOOD PRESSURE: 101 MMHG | DIASTOLIC BLOOD PRESSURE: 65 MMHG | BODY MASS INDEX: 20.67 KG/M2 | WEIGHT: 121.1 LBS | RESPIRATION RATE: 18 BRPM | HEART RATE: 94 BPM | TEMPERATURE: 98.1 F | HEIGHT: 64 IN | OXYGEN SATURATION: 100 %

## 2023-02-14 DIAGNOSIS — Z12.4 CERVICAL CANCER SCREENING: ICD-10-CM

## 2023-02-14 DIAGNOSIS — M25.669 STIFFNESS OF KNEE JOINT, UNSPECIFIED LATERALITY: ICD-10-CM

## 2023-02-14 DIAGNOSIS — N94.10 DYSPAREUNIA IN FEMALE: ICD-10-CM

## 2023-02-14 DIAGNOSIS — E83.59 NEPHROCALCINOSIS: ICD-10-CM

## 2023-02-14 DIAGNOSIS — R31.29 MICROHEMATURIA: ICD-10-CM

## 2023-02-14 DIAGNOSIS — Z12.11 COLON CANCER SCREENING: ICD-10-CM

## 2023-02-14 DIAGNOSIS — N95.2 ATROPHIC VAGINITIS: ICD-10-CM

## 2023-02-14 DIAGNOSIS — M25.531 RIGHT WRIST PAIN: ICD-10-CM

## 2023-02-14 DIAGNOSIS — N29 NEPHROCALCINOSIS: ICD-10-CM

## 2023-02-14 DIAGNOSIS — Z00.00 ROUTINE GENERAL MEDICAL EXAMINATION AT A HEALTH CARE FACILITY: Primary | ICD-10-CM

## 2023-02-14 LAB
C TRACH DNA SPEC QL NAA+PROBE: NEGATIVE
N GONORRHOEA DNA SPEC QL NAA+PROBE: NEGATIVE

## 2023-02-14 PROCEDURE — 87591 N.GONORRHOEAE DNA AMP PROB: CPT | Performed by: FAMILY MEDICINE

## 2023-02-14 PROCEDURE — 87624 HPV HI-RISK TYP POOLED RSLT: CPT | Performed by: FAMILY MEDICINE

## 2023-02-14 PROCEDURE — 87491 CHLMYD TRACH DNA AMP PROBE: CPT | Performed by: FAMILY MEDICINE

## 2023-02-14 PROCEDURE — 99396 PREV VISIT EST AGE 40-64: CPT | Performed by: FAMILY MEDICINE

## 2023-02-14 PROCEDURE — G0145 SCR C/V CYTO,THINLAYER,RESCR: HCPCS | Performed by: FAMILY MEDICINE

## 2023-02-14 ASSESSMENT — ANXIETY QUESTIONNAIRES
GAD7 TOTAL SCORE: 0
7. FEELING AFRAID AS IF SOMETHING AWFUL MIGHT HAPPEN: NOT AT ALL
2. NOT BEING ABLE TO STOP OR CONTROL WORRYING: NOT AT ALL
5. BEING SO RESTLESS THAT IT IS HARD TO SIT STILL: NOT AT ALL
GAD7 TOTAL SCORE: 0
3. WORRYING TOO MUCH ABOUT DIFFERENT THINGS: NOT AT ALL
6. BECOMING EASILY ANNOYED OR IRRITABLE: NOT AT ALL
IF YOU CHECKED OFF ANY PROBLEMS ON THIS QUESTIONNAIRE, HOW DIFFICULT HAVE THESE PROBLEMS MADE IT FOR YOU TO DO YOUR WORK, TAKE CARE OF THINGS AT HOME, OR GET ALONG WITH OTHER PEOPLE: NOT DIFFICULT AT ALL
GAD7 TOTAL SCORE: 0
7. FEELING AFRAID AS IF SOMETHING AWFUL MIGHT HAPPEN: NOT AT ALL
8. IF YOU CHECKED OFF ANY PROBLEMS, HOW DIFFICULT HAVE THESE MADE IT FOR YOU TO DO YOUR WORK, TAKE CARE OF THINGS AT HOME, OR GET ALONG WITH OTHER PEOPLE?: NOT DIFFICULT AT ALL
1. FEELING NERVOUS, ANXIOUS, OR ON EDGE: NOT AT ALL
4. TROUBLE RELAXING: NOT AT ALL

## 2023-02-14 ASSESSMENT — ENCOUNTER SYMPTOMS
FEVER: 0
WEAKNESS: 0
HEARTBURN: 0
MYALGIAS: 0
NERVOUS/ANXIOUS: 0
ABDOMINAL PAIN: 0
JOINT SWELLING: 0
HEADACHES: 0
COUGH: 0
HEMATURIA: 0
DYSURIA: 0
PALPITATIONS: 0
DIARRHEA: 0
HEMATOCHEZIA: 0
PARESTHESIAS: 0
SORE THROAT: 0
BREAST MASS: 0
CONSTIPATION: 0
DIZZINESS: 0
ARTHRALGIAS: 0
CHILLS: 0
NAUSEA: 0
FREQUENCY: 0
SHORTNESS OF BREATH: 0
EYE PAIN: 0

## 2023-02-14 ASSESSMENT — PAIN SCALES - GENERAL: PAINLEVEL: SEVERE PAIN (6)

## 2023-02-14 ASSESSMENT — PATIENT HEALTH QUESTIONNAIRE - PHQ9
10. IF YOU CHECKED OFF ANY PROBLEMS, HOW DIFFICULT HAVE THESE PROBLEMS MADE IT FOR YOU TO DO YOUR WORK, TAKE CARE OF THINGS AT HOME, OR GET ALONG WITH OTHER PEOPLE: NOT DIFFICULT AT ALL
SUM OF ALL RESPONSES TO PHQ QUESTIONS 1-9: 2
SUM OF ALL RESPONSES TO PHQ QUESTIONS 1-9: 2

## 2023-02-14 NOTE — PROGRESS NOTES
SUBJECTIVE:   CC: Brianna is an 52 year old who presents for preventive health visit.   Patient has been advised of split billing requirements and indicates understanding: Yes     Healthy Habits:     Getting at least 3 servings of Calcium per day:  Yes    Bi-annual eye exam:  NO    Dental care twice a year:  Yes    Sleep apnea or symptoms of sleep apnea:  None    Diet:  Regular (no restrictions)    Frequency of exercise:  4-5 days/week    Duration of exercise:  30-45 minutes    Taking medications regularly:  Yes    PHQ-2 Total Score: 0    Additional concerns today:  No    HM-   --Colonoscopy done end of August.  [facility], [facility], [phone]  Unable to see procedure link, nor pathology report.  Said 10 yr follow-up.  --Mammo nl a year ago.  --Pap due this summer, so will do today.    Wrist pain-   Fell and landed on R wrist just this am, on the ice this am.  Outstretched hand behind her.  Stiff and moderately painful now, especially if she clenches.  Hurt when she lifted her purse.    No point tenderness.  Moving wrist okay.      Exercising again- running 3-4d/wk.  Signed up for half marathon.  Warrior sculpt class once weekly.  xc-skiing or hiking on weekends.    Weight is back down a bit- down to 119 lbs.  Had stopped due to knee pain.  Sometimes feel a little inflamed.  Feels good when she runs, though.          Answers for HPI/ROS submitted by the patient on 2/14/2023  If you checked off any problems, how difficult have these problems made it for you to do your work, take care of things at home, or get along with other people?: Not difficult at all  PHQ9 TOTAL SCORE: 2  ETHNA 7 TOTAL SCORE: 0      Today's PHQ-2 Score:   PHQ-2 ( 1999 Pfizer) 2/13/2023   Q1: Little interest or pleasure in doing things 0   Q2: Feeling down, depressed or hopeless 0   PHQ-2 Score 0   PHQ-2 Total Score (12-17 Years)- Positive if 3 or more points; Administer PHQ-A if positive -   Q1:  Little interest or pleasure in doing things Not at all   Q2: Feeling down, depressed or hopeless Not at all   PHQ-2 Score 0           Social History     Tobacco Use     Smoking status: Never     Smokeless tobacco: Never   Substance Use Topics     Alcohol use: Yes     Comment: social, 1 drink or less per day     If you drink alcohol do you typically have >3 drinks per day or >7 drinks per week? No    No flowsheet data found.    Reviewed orders with patient.  Reviewed health maintenance and updated orders accordingly - Yes    Lab work is in process  Labs reviewed in EPIC  BP Readings from Last 3 Encounters:   02/14/23 101/65   06/07/22 114/68   05/31/22 113/69    Wt Readings from Last 3 Encounters:   02/14/23 54.9 kg (121 lb 1.6 oz)   10/26/22 55.3 kg (122 lb)   06/07/22 57.6 kg (127 lb)                  Patient Active Problem List   Diagnosis     Primary localized osteoarthrosis, hand     CARDIOVASCULAR SCREENING; LDL GOAL LESS THAN 160     Female infertility of other specified origin     Microhematuria     Dyspareunia in female     Atrophic vaginitis     Vaginal bleeding     Nephrocalcinosis     Past Surgical History:   Procedure Laterality Date     HC TOOTH EXTRACTION W/FORCEP      wisdom teeth       Social History     Tobacco Use     Smoking status: Never     Smokeless tobacco: Never   Substance Use Topics     Alcohol use: Yes     Comment: social, 1 drink or less per day     Family History   Problem Relation Age of Onset     Hematuria Mother         Underwent many tests years ago and never found cause     Hypertension Father      Hematuria Father         Was investgated years ago but not sure if cause was found or treated and resolved with antibiotics     Hearing Loss Father         Can still hear, but using hearing aids     Cerebrovascular Disease Maternal Grandmother      Cancer Maternal Grandmother         skin     Heart Disease Maternal Grandmother         Congestive heart failure in 80 s     Cerebrovascular  Disease Maternal Grandfather         passed away     Arthritis Maternal Grandfather         Had arthritis, but not sure what kind     Hypertension Paternal Grandmother      Hearing Loss Paternal Grandmother         Could still hear, but hearing impared     Hypertension Paternal Grandfather      Diabetes Paternal Grandfather      PEDROASumiD. Paternal Grandfather 62        MI in early 60s     Enlarged prostate Paternal Grandfather         Biopsy done, no malignancy found, managing with medication     Heart Disease Paternal Grandfather          of heart attack at 64     Cancer Paternal Uncle 54        pancreatic cancer- passed away at 54     Breast Cancer Maternal Aunt 66     Cancer Paternal Aunt         tongue ca, smoker/etoh     Breast Cancer Maternal Cousin 35        mastectomy, chemo planned     Hematuria Sister         Detected a couple of years ago but was not investigated     Cancer Other         Breast cancer in 70 s     Breast Cancer Other         Maternal Aunt     Cancer Other         Pancreatic cancer in 50 s     Other Cancer Other         Pancreatic - paternal uncle     Breast Cancer Cousin         Mother's Brother's duaghter     Mental Illness Cousin         Mom's sister's daughter         Current Outpatient Medications   Medication Sig Dispense Refill     estradiol (ESTRACE) 0.1 MG/GM vaginal cream Place 2 g vaginally twice a week 42.5 g 11     NO ACTIVE MEDICATIONS        Allergies   Allergen Reactions     Sulfa Drugs Hives and Swelling     Facial swelling, hives, heart racing.     Recent Labs   Lab Test 22  0823 22  0810 10/06/21  0957 21  0845 12/10/19  0750 19  0832   A1C  --   --   --  5.4  --   --    LDL  --  88  --  114* 117*  --    HDL  --  73  --  73 66  --    TRIG  --  66  --  88 83  --    ALT  --   --   --   --   --  36   CR 0.80 0.66   < > 0.77  --  0.72   GFRESTIMATED 89 >90   < > >90  --  >90   GFRESTBLACK  --   --   --  >90  --  >90   POTASSIUM 4.1 3.8   < > 4.0  --   3.4   TSH  --  3.24  --   --   --   --     < > = values in this interval not displayed.        Breast Cancer Screening:    FHS-7:   Breast CA Risk Assessment (FHS-7) 2/17/2021 3/14/2022 2/13/2023   Did any of your first-degree relatives have breast or ovarian cancer? No No No   Did any of your relatives have bilateral breast cancer? No No No   Did any man in your family have breast cancer? No No No   Did any woman in your family have breast and ovarian cancer? - No Yes   Did any woman in your family have breast cancer before age 50 y? - Yes Yes   Do you have 2 or more relatives with breast and/or ovarian cancer? - Yes Yes   Do you have 2 or more relatives with breast and/or bowel cancer? - No No       Mammogram Screening: Recommended annual mammography  Pertinent mammograms are reviewed under the imaging tab.    History of abnormal Pap smear: NO - age 30-65 PAP every 5 years with negative HPV co-testing recommended  PAP / HPV Latest Ref Rng & Units 2/14/2023 7/6/2018 12/2/2014   PAP   Negative for Intraepithelial Lesion or Malignancy (NILM) - -   PAP (Historical) - - NIL NIL   HPV16 Negative Negative Negative -   HPV18 Negative Negative Negative -   HRHPV Negative Negative Negative -     Reviewed and updated as needed this visit by clinical staff   Tobacco  Allergies  Meds  Problems  Med Hx  Surg Hx  Fam Hx          Reviewed and updated as needed this visit by Provider   Tobacco  Allergies  Meds  Problems  Med Hx  Surg Hx  Fam Hx             Review of Systems   Constitutional: Negative for chills and fever.   HENT: Negative for congestion, ear pain, hearing loss and sore throat.    Eyes: Negative for pain and visual disturbance.   Respiratory: Negative for cough and shortness of breath.    Cardiovascular: Negative for chest pain, palpitations and peripheral edema.   Gastrointestinal: Negative for abdominal pain, constipation, diarrhea, heartburn, hematochezia and nausea.   Breasts:  Negative for  "tenderness, breast mass and discharge.   Genitourinary: Negative for dysuria, frequency, genital sores, hematuria, pelvic pain, urgency, vaginal bleeding and vaginal discharge.   Musculoskeletal: Negative for arthralgias, joint swelling and myalgias.   Skin: Negative for rash.   Neurological: Negative for dizziness, weakness, headaches and paresthesias.   Psychiatric/Behavioral: Negative for mood changes. The patient is not nervous/anxious.           OBJECTIVE:   /65   Pulse 94   Temp 98.1  F (36.7  C) (Oral)   Resp 18   Ht 1.613 m (5' 3.5\")   Wt 54.9 kg (121 lb 1.6 oz)   SpO2 100%   BMI 21.12 kg/m    Physical Exam  GENERAL: healthy, alert and no distress  EYES: Eyes grossly normal to inspection, PERRL and conjunctivae and sclerae normal  HENT: ear canals and TM's normal, nose and mouth without ulcers or lesions  NECK: no adenopathy, no asymmetry, masses, or scars and thyroid normal to palpation  RESP: lungs clear to auscultation - no rales, rhonchi or wheezes  BREAST: normal without masses, tenderness or nipple discharge and no palpable axillary masses or adenopathy  CV: regular rate and rhythm, normal S1 S2, no S3 or S4, no murmur, click or rub, no peripheral edema and peripheral pulses strong  ABDOMEN: soft, nontender, no hepatosplenomegaly, no masses and bowel sounds normal   (female): normal female external genitalia, normal urethral meatus, vaginal mucosa pink, moist, well rugated, and normal cervix/adnexa/uterus without masses or discharge  MS: no gross musculoskeletal defects noted, no edema  SKIN: no suspicious lesions or rashes  NEURO: Normal strength and tone, mentation intact and speech normal  PSYCH: mentation appears normal, affect normal/bright    Diagnostic Test Results:  Labs reviewed in Epic    ASSESSMENT/PLAN:       ICD-10-CM    1. Routine general medical examination at a health care facility  Z00.00 NEISSERIA GONORRHOEA PCR     CHLAMYDIA TRACHOMATIS PCR      2. Dyspareunia in " female  N94.10       3. Stiffness of knee joint, unspecified laterality  M25.669 Physical Therapy Referral      4. Colon cancer screening  Z12.11       5. Cervical cancer screening  Z12.4 Pap Screen with HPV - recommended age 30 - 65 years     HPV Hold (Lab Only)     HPV High Risk Types DNA Cervical      6. Right wrist pain  M25.531       7. Microhematuria  R31.29 Basic metabolic panel  (Ca, Cl, CO2, Creat, Gluc, K, Na, BUN)     UA with Microscopic reflex to Culture - lab collect      8. Nephrocalcinosis  E83.59 Basic metabolic panel  (Ca, Cl, CO2, Creat, Gluc, K, Na, BUN)    N29 UA with Microscopic reflex to Culture - lab collect        CPE- Discussed diet, calcium/D and exercise.  Thin prep pap was done.  Eye and dental care UTD or recommended f/u.  No immunizations needed today.  See orders below for tests ordered and screening needed.     Dyspareunia- not currently sexually active, so hasn't been using the estradiol cream (except for once last night), so pap was uncomfortable (but doable with peds speculum) today.  Has refills to use, will likely start.    R wrist pain- fell on outstretched hand this am on ice.  No point tenderness, but would monitor sx's and be seen if worsening pain or point tenderness develops.    Microhematuria/nephrocalcinosis- seen by urology and nephrology in the last year.  Reviewed consults with Dr. Cabrera (urology- rec 2x/yr UA's, with follow-up if worsening or any gross hematuria) and Stuart Coyne (nephrology- rec 2x/yr BMP, follow-up if worsening kidney function).   Will continue q6mo BMP/UA's per recs, with follow-up if worsening hematuria/GFR.      Patient has been advised of split billing requirements and indicates understanding: Yes      COUNSELING:  Reviewed preventive health counseling, as reflected in patient instructions        She reports that she has never smoked. She has never used smokeless tobacco.      Nia Chavez MD  [facility]

## 2023-02-17 LAB
BKR LAB AP GYN ADEQUACY: NORMAL
BKR LAB AP GYN INTERPRETATION: NORMAL
BKR LAB AP HPV REFLEX: NORMAL
BKR LAB AP LMP: NORMAL
BKR LAB AP PREVIOUS ABNORMAL: NORMAL
PATH REPORT.COMMENTS IMP SPEC: NORMAL
PATH REPORT.COMMENTS IMP SPEC: NORMAL
PATH REPORT.RELEVANT HX SPEC: NORMAL

## 2023-02-20 NOTE — RESULT ENCOUNTER NOTE
-Your STD labs (gonorrhea, chlamydia) are both negative.    Please let me know if you have any questions.  Best,   Gulshan Chavez MD

## 2023-02-23 PROBLEM — N95.2 ATROPHIC VAGINITIS: Status: ACTIVE | Noted: 2021-03-03

## 2023-02-23 LAB
HUMAN PAPILLOMA VIRUS 16 DNA: NEGATIVE
HUMAN PAPILLOMA VIRUS 18 DNA: NEGATIVE
HUMAN PAPILLOMA VIRUS FINAL DIAGNOSIS: NORMAL
HUMAN PAPILLOMA VIRUS OTHER HR: NEGATIVE

## 2023-02-26 ENCOUNTER — TRANSFERRED RECORDS (OUTPATIENT)
Dept: HEALTH INFORMATION MANAGEMENT | Facility: CLINIC | Age: 53
End: 2023-02-26
Payer: COMMERCIAL

## 2023-03-08 ENCOUNTER — THERAPY VISIT (OUTPATIENT)
Dept: PHYSICAL THERAPY | Facility: CLINIC | Age: 53
End: 2023-03-08
Attending: FAMILY MEDICINE
Payer: COMMERCIAL

## 2023-03-08 DIAGNOSIS — M25.669 STIFFNESS OF KNEE JOINT, UNSPECIFIED LATERALITY: ICD-10-CM

## 2023-03-08 PROCEDURE — 97112 NEUROMUSCULAR REEDUCATION: CPT | Mod: GP | Performed by: PHYSICAL THERAPIST

## 2023-03-08 PROCEDURE — 97161 PT EVAL LOW COMPLEX 20 MIN: CPT | Mod: GP | Performed by: PHYSICAL THERAPIST

## 2023-03-08 PROCEDURE — 97110 THERAPEUTIC EXERCISES: CPT | Mod: GP | Performed by: PHYSICAL THERAPIST

## 2023-03-08 ASSESSMENT — ACTIVITIES OF DAILY LIVING (ADL)
GIVING WAY, BUCKLING OR SHIFTING OF KNEE: I DO NOT HAVE THE SYMPTOM
WALK: ACTIVITY IS NOT DIFFICULT
SQUAT: ACTIVITY IS NOT DIFFICULT
GO UP STAIRS: ACTIVITY IS MINIMALLY DIFFICULT
RAW_SCORE: 67
PAIN: THE SYMPTOM AFFECTS MY ACTIVITY SLIGHTLY
WEAKNESS: I DO NOT HAVE THE SYMPTOM
STIFFNESS: I DO NOT HAVE THE SYMPTOM
KNEE_ACTIVITY_OF_DAILY_LIVING_SCORE: 95.71
AS_A_RESULT_OF_YOUR_KNEE_INJURY,_HOW_WOULD_YOU_RATE_YOUR_CURRENT_LEVEL_OF_DAILY_ACTIVITY?: NORMAL
HOW_WOULD_YOU_RATE_THE_CURRENT_FUNCTION_OF_YOUR_KNEE_DURING_YOUR_USUAL_DAILY_ACTIVITIES_ON_A_SCALE_FROM_0_TO_100_WITH_100_BEING_YOUR_LEVEL_OF_KNEE_FUNCTION_PRIOR_TO_YOUR_INJURY_AND_0_BEING_THE_INABILITY_TO_PERFORM_ANY_OF_YOUR_USUAL_DAILY_ACTIVITIES?: 95
KNEEL ON THE FRONT OF YOUR KNEE: ACTIVITY IS NOT DIFFICULT
RISE FROM A CHAIR: ACTIVITY IS NOT DIFFICULT
STAND: ACTIVITY IS NOT DIFFICULT
SWELLING: I DO NOT HAVE THE SYMPTOM
KNEE_ACTIVITY_OF_DAILY_LIVING_SUM: 67
SIT WITH YOUR KNEE BENT: ACTIVITY IS NOT DIFFICULT
HOW_WOULD_YOU_RATE_THE_OVERALL_FUNCTION_OF_YOUR_KNEE_DURING_YOUR_USUAL_DAILY_ACTIVITIES?: NEARLY NORMAL
GO DOWN STAIRS: ACTIVITY IS NOT DIFFICULT
LIMPING: I DO NOT HAVE THE SYMPTOM

## 2023-03-08 NOTE — PROGRESS NOTES
Physical Therapy Initial Evaluation  Subjective:  The history is provided by the patient.   Patient Health History  Brianna Lew being seen for Evaluate running form to minimize chance of knee injury.     Problem began: 3/8/2023.   Problem occurred: My left knee bothers me intermittently, but not sure when exactly it started ir what may have triggered it. Longer runs hurt at the end of the run, some times when stopping mid run, will have pain mid run again.   Pain is reported as 1/10 on pain scale.  General health as reported by patient is good.  Pertinent medical history includes: none.     Medical allergies: other. Other medical allergies details: Sulfa based medications.   Surgeries include:  None.    Current medications:  Other. Other medications details: Estridol.    Current occupation is .   Primary job tasks include:  Computer work.   Other job/home tasks details: recently signed up for [employer] half- ramped up training.                Therapist Generated HPI Evaluation         Type of problem:  Left knee.    This is a recurrent condition.  Condition occurred with:  Running.  Where condition occurred: in the community.  Patient reports pain:  Anterior and lateral.  Pain is described as aching and is intermittent.  Pain radiates to:  Knee.   Since onset symptoms are gradually worsening (More consistent).  Associated symptoms:  Loss of motion/stiffness. Symptoms are exacerbated by ascending stairs, walking and running (initially while turning, or quick movement)  and relieved by rest.                              Objective:  Standing Alignment:        Lumbar:  Anterior pelvic tilt      Knee:  Patella squinting R and patella squinting L      Gait:    Weight Bearing Status:  WBAT     Deviations:  Hip:  Excessive flexion L, Trendelenberg L and Trendelenberg R                                                      Knee Evaluation:  ROM:  PROM: normal  Strength wnl knee: delayed QS on L, glute  med 3/5 B, glute max 4/5 B.              Special Tests: Special test for knee: L knee + Bam test for hip flexor tightness.      Palpation:    Left knee tenderness present at:  Patellar Tendon (inf pole patella)  Left knee tenderness not present at:  Medial Joint Line; Lateral Joint Line; Patellar Superior and Patellar Inferior    Right knee tenderness not present at:  Medial Joint Line; Lateral Joint Line; Patellar Superior and Patellar Inferior      Functional Testing:          Quad:    Single Leg Squat:  Left:       Moderate loss of control and femoral IR  Right:       Moderate loss of control and femoral IR  Bilateral Leg Squat:   Mild loss of control      Proprioception:   Stork Balance Test: Left:   Right:   % of Uninvolved:  Inc dynamic sway on L          General     ROS    Assessment/Plan:    Patient is a 52 year old female with left side knee complaints.    Patient has the following significant findings with corresponding treatment plan.                Diagnosis 1:  L knee patellafemoral pain    Pain -  hot/cold therapy, manual therapy, splint/taping/bracing/orthotics, self management, education and home program  Decreased ROM/flexibility - manual therapy, therapeutic exercise and home program  Decreased joint mobility - manual therapy, therapeutic exercise and home program  Decreased strength - therapeutic exercise, therapeutic activities and home program  Decreased proprioception - neuro re-education, gait training, therapeutic activities and home program      Cumulative Therapy Evaluation is: Low complexity.    Previous and current functional limitations:  (See Goal Flow Sheet for this information)    Short term and Long term goals: (See Goal Flow Sheet for this information)     Communication ability:  Patient appears to be able to clearly communicate and understand verbal and written communication and follow directions correctly.  Treatment Explanation - The following has been discussed with the  patient:   RX ordered/plan of care  Anticipated outcomes  Possible risks and side effects  This patient would benefit from PT intervention to resume normal activities.   Rehab potential is good.    Frequency:  1 X week, once daily  Duration:  for 4 weeks tapering to 2 X a month over 8 weeks  Discharge Plan:  Achieve all LTG.  Independent in home treatment program.  Reach maximal therapeutic benefit.    Please refer to the daily flowsheet for treatment today, total treatment time and time spent performing 1:1 timed codes.

## 2023-03-24 ENCOUNTER — THERAPY VISIT (OUTPATIENT)
Dept: PHYSICAL THERAPY | Facility: CLINIC | Age: 53
End: 2023-03-24
Payer: COMMERCIAL

## 2023-03-24 DIAGNOSIS — M25.669 STIFFNESS OF KNEE JOINT, UNSPECIFIED LATERALITY: Primary | ICD-10-CM

## 2023-03-24 PROCEDURE — 97112 NEUROMUSCULAR REEDUCATION: CPT | Mod: GP | Performed by: PHYSICAL THERAPIST

## 2023-03-24 PROCEDURE — 97110 THERAPEUTIC EXERCISES: CPT | Mod: GP | Performed by: PHYSICAL THERAPIST

## 2023-03-30 ENCOUNTER — HOSPITAL ENCOUNTER (OUTPATIENT)
Dept: MAMMOGRAPHY | Facility: CLINIC | Age: 53
Discharge: HOME | End: 2023-03-30
Attending: FAMILY MEDICINE | Admitting: FAMILY MEDICINE
Payer: COMMERCIAL

## 2023-03-30 DIAGNOSIS — Z12.31 VISIT FOR SCREENING MAMMOGRAM: ICD-10-CM

## 2023-03-30 PROCEDURE — 77067 SCR MAMMO BI INCL CAD: CPT

## 2023-03-31 ENCOUNTER — THERAPY VISIT (OUTPATIENT)
Dept: PHYSICAL THERAPY | Facility: CLINIC | Age: 53
End: 2023-03-31
Payer: COMMERCIAL

## 2023-03-31 DIAGNOSIS — M25.669 STIFFNESS OF KNEE JOINT, UNSPECIFIED LATERALITY: Primary | ICD-10-CM

## 2023-03-31 PROCEDURE — 97110 THERAPEUTIC EXERCISES: CPT | Mod: GP | Performed by: PHYSICAL THERAPIST

## 2023-03-31 PROCEDURE — 97140 MANUAL THERAPY 1/> REGIONS: CPT | Mod: GP | Performed by: PHYSICAL THERAPIST

## 2023-04-14 ENCOUNTER — THERAPY VISIT (OUTPATIENT)
Dept: PHYSICAL THERAPY | Facility: CLINIC | Age: 53
End: 2023-04-14
Payer: COMMERCIAL

## 2023-04-14 DIAGNOSIS — M25.669 STIFFNESS OF KNEE JOINT, UNSPECIFIED LATERALITY: Primary | ICD-10-CM

## 2023-04-14 PROCEDURE — 97112 NEUROMUSCULAR REEDUCATION: CPT | Mod: GP | Performed by: PHYSICAL THERAPIST

## 2023-04-14 PROCEDURE — 97110 THERAPEUTIC EXERCISES: CPT | Mod: GP | Performed by: PHYSICAL THERAPIST

## 2023-04-14 PROCEDURE — 97530 THERAPEUTIC ACTIVITIES: CPT | Mod: GP | Performed by: PHYSICAL THERAPIST

## 2023-05-12 ENCOUNTER — THERAPY VISIT (OUTPATIENT)
Dept: PHYSICAL THERAPY | Facility: CLINIC | Age: 53
End: 2023-05-12
Payer: COMMERCIAL

## 2023-05-12 DIAGNOSIS — M25.669 STIFFNESS OF KNEE JOINT, UNSPECIFIED LATERALITY: Primary | ICD-10-CM

## 2023-05-12 PROCEDURE — 97530 THERAPEUTIC ACTIVITIES: CPT | Mod: GP | Performed by: PHYSICAL THERAPIST

## 2023-05-12 PROCEDURE — 97110 THERAPEUTIC EXERCISES: CPT | Mod: GP | Performed by: PHYSICAL THERAPIST

## 2023-05-12 PROCEDURE — 97140 MANUAL THERAPY 1/> REGIONS: CPT | Mod: GP | Performed by: PHYSICAL THERAPIST

## 2023-06-10 ENCOUNTER — OFFICE VISIT (OUTPATIENT)
Dept: URGENT CARE | Facility: URGENT CARE | Age: 53
End: 2023-06-10
Payer: COMMERCIAL

## 2023-06-10 VITALS
WEIGHT: 123.8 LBS | TEMPERATURE: 97.9 F | SYSTOLIC BLOOD PRESSURE: 132 MMHG | DIASTOLIC BLOOD PRESSURE: 72 MMHG | OXYGEN SATURATION: 100 % | HEART RATE: 102 BPM | BODY MASS INDEX: 21.59 KG/M2

## 2023-06-10 DIAGNOSIS — M79.675 GREAT TOE PAIN, LEFT: Primary | ICD-10-CM

## 2023-06-10 PROCEDURE — 99213 OFFICE O/P EST LOW 20 MIN: CPT | Performed by: FAMILY MEDICINE

## 2023-06-10 NOTE — PROGRESS NOTES
SUBJECTIVE: Brianna Lew is a 52 year old female presenting with a chief complaint of left great toe swelling, no pain.  Onset of symptoms was day(s) ago.    Past Medical History:   Diagnosis Date     Other abnormal heart sounds     told she doesn't need abx     Unspecified breast disorder     pea-like structure 10 yrs ago, u/s x 2 normal, last one in '95     Allergies   Allergen Reactions     Sulfa Antibiotics Hives and Swelling     Facial swelling, hives, heart racing.     Social History     Tobacco Use     Smoking status: Never     Smokeless tobacco: Never   Vaping Use     Vaping status: Not on file   Substance Use Topics     Alcohol use: Yes     Comment: social, 1 drink or less per day       ROS:  SKIN: no rash  GI: no vomiting    OBJECTIVE:  /72   Pulse 102   Temp 97.9  F (36.6  C) (Tympanic)   Wt 56.2 kg (123 lb 12.8 oz)   SpO2 100%   BMI 21.59 kg/m  GENERAL APPEARANCE: healthy, alert and no distress  NEURO: Normal strength and tone, sensory exam grossly normal,  normal speech and mentation  SKIN: slight lateral great left toe swelling along nail, non tender, no erythema/dc      ICD-10-CM    1. Great toe pain, left  M79.675 Orthopedic  Referral          Fluids/Rest, f/u if worse/not any better

## 2023-06-29 ENCOUNTER — TRANSFERRED RECORDS (OUTPATIENT)
Dept: HEALTH INFORMATION MANAGEMENT | Facility: CLINIC | Age: 53
End: 2023-06-29

## 2023-06-29 LAB
ALT SERPL-CCNC: 22 IU/L (ref 0–32)
AST SERPL-CCNC: 19 IU/L (ref 0–40)
CHOLESTEROL (EXTERNAL): 206 MG/DL (ref 100–199)
CREATININE (EXTERNAL): 0.81 MG/DL (ref 0.57–1)
GFR ESTIMATED (EXTERNAL): 88 ML/MIN/1.73
GLUCOSE (EXTERNAL): 100 MG/DL (ref 70–99)
HDLC SERPL-MCNC: 81 MG/DL
LDL CHOLESTEROL CALCULATED (EXTERNAL): 106 MG/DL (ref 0–99)
NON HDL CHOLESTEROL (EXTERNAL): 125 MG/DL (ref 0–129)
POTASSIUM (EXTERNAL): 4.3 MMOL/L (ref 3.5–5.2)
TRIGLYCERIDES (EXTERNAL): 108 MG/DL (ref 0–149)

## 2023-08-15 ENCOUNTER — ALLIED HEALTH/NURSE VISIT (OUTPATIENT)
Dept: FAMILY MEDICINE | Facility: CLINIC | Age: 53
End: 2023-08-15
Payer: COMMERCIAL

## 2023-08-15 ENCOUNTER — LAB (OUTPATIENT)
Dept: LAB | Facility: CLINIC | Age: 53
End: 2023-08-15
Payer: COMMERCIAL

## 2023-08-15 DIAGNOSIS — E83.59 NEPHROCALCINOSIS: ICD-10-CM

## 2023-08-15 DIAGNOSIS — N29 NEPHROCALCINOSIS: ICD-10-CM

## 2023-08-15 DIAGNOSIS — Z23 ENCOUNTER FOR IMMUNIZATION: Primary | ICD-10-CM

## 2023-08-15 DIAGNOSIS — R31.29 MICROHEMATURIA: ICD-10-CM

## 2023-08-15 LAB
ALBUMIN UR-MCNC: NEGATIVE MG/DL
APPEARANCE UR: CLEAR
BACTERIA #/AREA URNS HPF: ABNORMAL /HPF
BILIRUB UR QL STRIP: NEGATIVE
COLOR UR AUTO: YELLOW
GLUCOSE UR STRIP-MCNC: NEGATIVE MG/DL
HGB UR QL STRIP: ABNORMAL
KETONES UR STRIP-MCNC: NEGATIVE MG/DL
LEUKOCYTE ESTERASE UR QL STRIP: ABNORMAL
NITRATE UR QL: NEGATIVE
PH UR STRIP: 7 [PH] (ref 5–7)
RBC #/AREA URNS AUTO: ABNORMAL /HPF
SP GR UR STRIP: 1.01 (ref 1–1.03)
SQUAMOUS #/AREA URNS AUTO: ABNORMAL /LPF
UROBILINOGEN UR STRIP-ACNC: 0.2 E.U./DL
WBC #/AREA URNS AUTO: ABNORMAL /HPF

## 2023-08-15 PROCEDURE — 99207 PR NO CHARGE NURSE ONLY: CPT

## 2023-08-15 PROCEDURE — 90471 IMMUNIZATION ADMIN: CPT

## 2023-08-15 PROCEDURE — 90715 TDAP VACCINE 7 YRS/> IM: CPT

## 2023-08-15 PROCEDURE — 81001 URINALYSIS AUTO W/SCOPE: CPT

## 2023-08-15 NOTE — RESULT ENCOUNTER NOTE
-The red blood cells in your urine look okay.  Bit more than last time, but similar to in the past, so I think we can continue checking it every six months (next time at or before your physical) as there has not been any significant worsening.    I thought we were also going to check a basic metabolic blood test every six months as well, but I don't see that that was drawn or running?  I did have lab orders for that to be run...     It would be best for you to come back and get that blood drawn.  Sorry for the inconvenience...    Best,   Gulshan Chavez MD

## 2023-08-15 NOTE — PROGRESS NOTES
Prior to immunization administration, verified patients identity using patient s name and date of birth. Please see Immunization Activity for additional information.     Screening Questionnaire for Adult Immunization    Are you sick today?   No   Do you have allergies to medications, food, a vaccine component or latex?   Yes   Have you ever had a serious reaction after receiving a vaccination?   No   Do you have a long-term health problem with heart, lung, kidney, or metabolic disease (e.g., diabetes), asthma, a blood disorder, no spleen, complement component deficiency, a cochlear implant, or a spinal fluid leak?  Are you on long-term aspirin therapy?   No   Do you have cancer, leukemia, HIV/AIDS, or any other immune system problem?   No   Do you have a parent, brother, or sister with an immune system problem?   No   In the past 3 months, have you taken medications that affect  your immune system, such as prednisone, other steroids, or anticancer drugs; drugs for the treatment of rheumatoid arthritis, Crohn s disease, or psoriasis; or have you had radiation treatments?   No   Have you had a seizure, or a brain or other nervous system problem?   No   During the past year, have you received a transfusion of blood or blood    products, or been given immune (gamma) globulin or antiviral drug?   No   For women: Are you pregnant or is there a chance you could become       pregnant during the next month?   No   Have you received any vaccinations in the past 4 weeks?   No     Immunization questionnaire was positive for at least one answer.  Notified.     I have reviewed the following standing orders:   This patient is due and qualifies for a TDAP vaccine.    Click here for Tdap Standing Order    I have reviewed the vaccines inclusion and exclusion criteria; No concerns regarding eligibility.     Patient instructed to remain in clinic for 15 minutes afterwards, and to report any adverse reactions.     Screening performed by  Laney Girard CMA on 8/15/2023 at 8:27 AM.

## 2023-09-14 ENCOUNTER — LAB (OUTPATIENT)
Dept: LAB | Facility: CLINIC | Age: 53
End: 2023-09-14
Payer: COMMERCIAL

## 2023-09-14 DIAGNOSIS — Z13.6 CARDIOVASCULAR SCREENING; LDL GOAL LESS THAN 160: ICD-10-CM

## 2023-09-14 DIAGNOSIS — R79.89 ABNORMAL CBC: ICD-10-CM

## 2023-09-14 DIAGNOSIS — N29 NEPHROCALCINOSIS: ICD-10-CM

## 2023-09-14 DIAGNOSIS — E83.59 NEPHROCALCINOSIS: ICD-10-CM

## 2023-09-14 DIAGNOSIS — R31.29 MICROHEMATURIA: ICD-10-CM

## 2023-09-14 LAB
ANION GAP SERPL CALCULATED.3IONS-SCNC: 11 MMOL/L (ref 7–15)
BUN SERPL-MCNC: 13.8 MG/DL (ref 6–20)
CALCIUM SERPL-MCNC: 9.8 MG/DL (ref 8.6–10)
CHLORIDE SERPL-SCNC: 100 MMOL/L (ref 98–107)
CHOLEST SERPL-MCNC: 181 MG/DL
CREAT SERPL-MCNC: 0.75 MG/DL (ref 0.51–0.95)
DEPRECATED HCO3 PLAS-SCNC: 25 MMOL/L (ref 22–29)
EGFRCR SERPLBLD CKD-EPI 2021: >90 ML/MIN/1.73M2
ERYTHROCYTE [DISTWIDTH] IN BLOOD BY AUTOMATED COUNT: 12.9 % (ref 10–15)
FOLATE SERPL-MCNC: 28.8 NG/ML (ref 4.6–34.8)
GLUCOSE SERPL-MCNC: 98 MG/DL (ref 70–99)
HCT VFR BLD AUTO: 38.1 % (ref 35–47)
HDLC SERPL-MCNC: 72 MG/DL
HGB BLD-MCNC: 12.8 G/DL (ref 11.7–15.7)
LDLC SERPL CALC-MCNC: 93 MG/DL
MCH RBC QN AUTO: 29.7 PG (ref 26.5–33)
MCHC RBC AUTO-ENTMCNC: 33.6 G/DL (ref 31.5–36.5)
MCV RBC AUTO: 88 FL (ref 78–100)
NONHDLC SERPL-MCNC: 109 MG/DL
PLATELET # BLD AUTO: 263 10E3/UL (ref 150–450)
POTASSIUM SERPL-SCNC: 4.2 MMOL/L (ref 3.4–5.3)
RBC # BLD AUTO: 4.31 10E6/UL (ref 3.8–5.2)
SODIUM SERPL-SCNC: 136 MMOL/L (ref 136–145)
TRIGL SERPL-MCNC: 81 MG/DL
WBC # BLD AUTO: 6.5 10E3/UL (ref 4–11)

## 2023-09-14 PROCEDURE — 82746 ASSAY OF FOLIC ACID SERUM: CPT

## 2023-09-14 PROCEDURE — 80061 LIPID PANEL: CPT

## 2023-09-14 PROCEDURE — 36415 COLL VENOUS BLD VENIPUNCTURE: CPT

## 2023-09-14 PROCEDURE — 85027 COMPLETE CBC AUTOMATED: CPT

## 2023-09-14 PROCEDURE — 80048 BASIC METABOLIC PNL TOTAL CA: CPT

## 2023-09-22 ENCOUNTER — TELEPHONE (OUTPATIENT)
Dept: FAMILY MEDICINE | Facility: CLINIC | Age: 53
End: 2023-09-22
Payer: COMMERCIAL

## 2023-09-22 NOTE — TELEPHONE ENCOUNTER
Pt calling requesting information about whether it is ok to get Moderna covid vaccine for the new covid vaccine as she has always had pfizer in the past.    Please advise and callback if not advised to switch manufacturers.    Thanks!  Pranav Monaco RN   [facility]

## 2023-09-24 NOTE — RESULT ENCOUNTER NOTE
-Your cholesterol panel looks great with a low LDL (the bad cholesterol) and a good/high HDL (the good cholesterol).   -Your basic metabolic panel (which includes electrolyte levels, blood sugar level and kidney function tests) looks normal.  -Your CBC labs (which includes blood labs looking for signs of infection or anemia) is also normal.  -Your folate level at our lab is also >20 at 28, but our normal range in 4.6-34.8, so looks good.  I'm not sure if you've already changed your multivitamin, though, and if your level would have been higher if not?  We can talk about it at your upcoming appointment (and maybe bring in your old and potential new vitamin containers).     Best,   Gulshan Chavez MD

## 2023-09-27 ENCOUNTER — VIRTUAL VISIT (OUTPATIENT)
Dept: FAMILY MEDICINE | Facility: CLINIC | Age: 53
End: 2023-09-27
Payer: COMMERCIAL

## 2023-09-27 DIAGNOSIS — R31.29 MICROHEMATURIA: Primary | ICD-10-CM

## 2023-09-27 DIAGNOSIS — N95.2 ATROPHIC VAGINITIS: ICD-10-CM

## 2023-09-27 DIAGNOSIS — Z71.84 COUNSELING FOR TRAVEL: ICD-10-CM

## 2023-09-27 DIAGNOSIS — Z13.6 CARDIOVASCULAR SCREENING; LDL GOAL LESS THAN 160: ICD-10-CM

## 2023-09-27 PROCEDURE — 99214 OFFICE O/P EST MOD 30 MIN: CPT | Mod: 95 | Performed by: FAMILY MEDICINE

## 2023-09-27 RX ORDER — ESTRADIOL 0.1 MG/G
2 CREAM VAGINAL
Qty: 42.5 G | Refills: 1 | Status: SHIPPED | OUTPATIENT
Start: 2023-09-28 | End: 2024-04-24

## 2023-09-27 RX ORDER — MULTIVIT WITH MINERALS/LUTEIN
1 TABLET ORAL DAILY
COMMUNITY

## 2023-09-27 NOTE — PROGRESS NOTES
Brianna is a 52 year old who is being evaluated via a billable video visit.      How would you like to obtain your AVS? MyChart  If the video visit is dropped, the invitation should be resent by: Text to cell phone: [phone]  Will anyone else be joining your video visit? No          Assessment & Plan       ICD-10-CM    1. Microhematuria  R31.29       2. Atrophic vaginitis  N95.2 estradiol (ESTRACE) 0.1 MG/GM vaginal cream      3. CARDIOVASCULAR SCREENING; LDL GOAL LESS THAN 160  Z13.6       4. Counseling for travel  Z71.84          Microhematuria/nephrocalcinosis- seen by urology and nephrology in . --Dr. Cabrera (urology)- rec 2x/yr UA's, with follow-up if worsening or any gross hematuria --Dr. Coyne (nephrology)- rec 2x/yr BMP, follow-up if worsening kidney function Will continue q6mo BMP/UA's per recs, with follow-up if worsening hematuria/GFR.   --Recent UA/BMP reviewed- BMP looks good, GFR >90.  UA with RBCs of 5-10, stable.  --Can continue q6mo BMPs/UAs.    Supplements/labs discussion-  Pt had been worried because testing elsewhere indicated that folate level was too high (>20 was indicated as abnl, though on forms she uploaded her this is shown as normal).  Recheck here was normal at ~28.  She stopped her regular centrum MVI, and ordered centrum Silver.  --Reviewed both options, both are good, though with centrum silver, she only needs to take one tab daily.    Atrophic vaginitis- had been on compounded estriol through Dr. Cabrera.  Not great about taking, but did think it helped when she did use it.  Current rx .  Will send more in- will try to use the later rx sent by Dr. Cabrera/team to [facility], but if it doesn't work, will send back in to  [facility].  Pt will call and let us know.    Lipids- these look great. LDL was 106 and marked abnl on outside tests, but discussed this is still pretty good, and recheck here showed an LDL of 93 which is great.  HDL at 72, also  great.    Travel counseling- heading towards [place] soon, 8 hr flight overnight, really wants to sleep on the flight, usually not able to.  Hasn't really taken sleep meds.  Discussed easiest good option is to try unisom, take 1/2-1 tab at start of flight.  Can be drying, so will be sure to hydrate.  Risks and benefits of medication(s) including potential side effects reviewed with patient.  Questions answered.       I spent a total of 33 minutes on the day of the visit.   Time spent by me doing chart review, history and exam, documentation and further activities per the note      Nia Chavez MD  [facility]            Burke Reed is a 52 year old, presenting for the following health issues:  Results and Sleep Problem        9/27/2023    11:34 AM   Additional Questions   Roomed by Shila BHAT       History of Present Illness       Reason for visit:  Review blood test results and ask about a sleep aid for airplane for an upcoming trip. Flight is 5 -6 hours longHope t sleep on the plane.    She eats 4 or more servings of fruits and vegetables daily.She consumes 0 sweetened beverage(s) daily.She exercises with enough effort to increase her heart rate 30 to 60 minutes per day.  She exercises with enough effort to increase her heart rate 4 days per week.   She is taking medications regularly.     Reviewed labs, supplements.    Dr. Cabrera had been filling her estradiol.    Insomnia-   She'll be taking a trip to [facility] 10/12-24/23- four friends.  10/5/23- really wants to sleep on the plane.  8 hrs.  Occasionally has bouts where she wakes up in the middle of the night- especially with COVID infection and one night after COVID vaccine recently.    Follow-up in 2/24      Review of Systems   Constitutional, HEENT, cardiovascular, pulmonary, gi and gu systems are negative, except as otherwise noted.      Objective    Vitals - Patient Reported  Weight (Patient Reported): 55.8 kg (123 lb)  Height  "(Patient Reported): 162.6 cm (5' 4\")  BMI (Based on Pt Reported Ht/Wt): 21.11        Physical Exam   GENERAL: Healthy, alert and no distress  EYES: Eyes grossly normal to inspection.  No discharge or erythema, or obvious scleral/conjunctival abnormalities.  RESP: No audible wheeze, cough, or visible cyanosis.  No visible retractions or increased work of breathing.    SKIN: Visible skin clear. No significant rash, abnormal pigmentation or lesions.  NEURO: Cranial nerves grossly intact.  Mentation and speech appropriate for age.  PSYCH: Mentation appears normal, affect normal/bright, judgement and insight intact, normal speech and appearance well-groomed.      Lab on 09/14/2023   Component Date Value Ref Range Status    Sodium 09/14/2023 136  136 - 145 mmol/L Final    Potassium 09/14/2023 4.2  3.4 - 5.3 mmol/L Final    Chloride 09/14/2023 100  98 - 107 mmol/L Final    Carbon Dioxide (CO2) 09/14/2023 25  22 - 29 mmol/L Final    Anion Gap 09/14/2023 11  7 - 15 mmol/L Final    Urea Nitrogen 09/14/2023 13.8  6.0 - 20.0 mg/dL Final    Creatinine 09/14/2023 0.75  0.51 - 0.95 mg/dL Final    Calcium 09/14/2023 9.8  8.6 - 10.0 mg/dL Final    Glucose 09/14/2023 98  70 - 99 mg/dL Final    GFR Estimate 09/14/2023 >90  >60 mL/min/1.73m2 Final    Folic Acid 09/14/2023 28.8  4.6 - 34.8 ng/mL Final    Cholesterol 09/14/2023 181  <200 mg/dL Final    Triglycerides 09/14/2023 81  <150 mg/dL Final    Direct Measure HDL 09/14/2023 72  >=50 mg/dL Final    LDL Cholesterol Calculated 09/14/2023 93  <=100 mg/dL Final    Non HDL Cholesterol 09/14/2023 109  <130 mg/dL Final    WBC Count 09/14/2023 6.5  4.0 - 11.0 10e3/uL Final    RBC Count 09/14/2023 4.31  3.80 - 5.20 10e6/uL Final    Hemoglobin 09/14/2023 12.8  11.7 - 15.7 g/dL Final    Hematocrit 09/14/2023 38.1  35.0 - 47.0 % Final    MCV 09/14/2023 88  78 - 100 fL Final    MCH 09/14/2023 29.7  26.5 - 33.0 pg Final    MCHC 09/14/2023 33.6  31.5 - 36.5 g/dL Final    RDW 09/14/2023 12.9  10.0 " - 15.0 % Final    Platelet Count 09/14/2023 263  150 - 450 10e3/uL Final     No results found for any visits on 09/27/23.        Video-Visit Details    Type of service:  Video Visit     Originating Location (pt. Location): Home    Distant Location (provider location):  On-site  Platform used for Video Visit: Coy

## 2024-01-15 ENCOUNTER — PATIENT OUTREACH (OUTPATIENT)
Dept: CARE COORDINATION | Facility: CLINIC | Age: 54
End: 2024-01-15
Payer: COMMERCIAL

## 2024-01-29 ENCOUNTER — PATIENT OUTREACH (OUTPATIENT)
Dept: CARE COORDINATION | Facility: CLINIC | Age: 54
End: 2024-01-29
Payer: COMMERCIAL

## 2024-03-31 ENCOUNTER — HEALTH MAINTENANCE LETTER (OUTPATIENT)
Age: 54
End: 2024-03-31

## 2024-04-18 ENCOUNTER — DOCUMENTATION ONLY (OUTPATIENT)
Dept: FAMILY MEDICINE | Facility: CLINIC | Age: 54
End: 2024-04-18
Payer: COMMERCIAL

## 2024-04-18 NOTE — PROGRESS NOTES
Needs q6mo BMP and UA.  Both are in future/standing orders, but one needed to have expiration date extended.  Thanks,  CW

## 2024-04-19 ENCOUNTER — MYC MEDICAL ADVICE (OUTPATIENT)
Dept: FAMILY MEDICINE | Facility: CLINIC | Age: 54
End: 2024-04-19
Payer: COMMERCIAL

## 2024-04-19 NOTE — TELEPHONE ENCOUNTER
CW,      Please see [employer] message.  No future labs ordered.  Physical is on 4/24 (last seen 2/24/23).    Fasting labs done 9/14/23

## 2024-04-22 ENCOUNTER — LAB (OUTPATIENT)
Dept: LAB | Facility: CLINIC | Age: 54
End: 2024-04-22
Payer: COMMERCIAL

## 2024-04-22 DIAGNOSIS — N29 NEPHROCALCINOSIS: ICD-10-CM

## 2024-04-22 DIAGNOSIS — R31.29 MICROHEMATURIA: ICD-10-CM

## 2024-04-22 DIAGNOSIS — E83.59 NEPHROCALCINOSIS: ICD-10-CM

## 2024-04-22 LAB
ALBUMIN UR-MCNC: NEGATIVE MG/DL
ANION GAP SERPL CALCULATED.3IONS-SCNC: 11 MMOL/L (ref 7–15)
APPEARANCE UR: CLEAR
BILIRUB UR QL STRIP: NEGATIVE
BUN SERPL-MCNC: 12.6 MG/DL (ref 6–20)
CALCIUM SERPL-MCNC: 9.7 MG/DL (ref 8.6–10)
CHLORIDE SERPL-SCNC: 99 MMOL/L (ref 98–107)
COLOR UR AUTO: YELLOW
CREAT SERPL-MCNC: 0.74 MG/DL (ref 0.51–0.95)
DEPRECATED HCO3 PLAS-SCNC: 26 MMOL/L (ref 22–29)
EGFRCR SERPLBLD CKD-EPI 2021: >90 ML/MIN/1.73M2
GLUCOSE SERPL-MCNC: 111 MG/DL (ref 70–99)
GLUCOSE UR STRIP-MCNC: NEGATIVE MG/DL
HGB UR QL STRIP: ABNORMAL
KETONES UR STRIP-MCNC: NEGATIVE MG/DL
LEUKOCYTE ESTERASE UR QL STRIP: ABNORMAL
NITRATE UR QL: NEGATIVE
PH UR STRIP: 7.5 [PH] (ref 5–7)
POTASSIUM SERPL-SCNC: 4.2 MMOL/L (ref 3.4–5.3)
RBC #/AREA URNS AUTO: NORMAL /HPF
SODIUM SERPL-SCNC: 136 MMOL/L (ref 135–145)
SP GR UR STRIP: 1.01 (ref 1–1.03)
UROBILINOGEN UR STRIP-ACNC: 0.2 E.U./DL
WBC #/AREA URNS AUTO: NORMAL /HPF

## 2024-04-22 PROCEDURE — 81001 URINALYSIS AUTO W/SCOPE: CPT

## 2024-04-22 PROCEDURE — 36415 COLL VENOUS BLD VENIPUNCTURE: CPT

## 2024-04-22 PROCEDURE — 80048 BASIC METABOLIC PNL TOTAL CA: CPT

## 2024-04-23 SDOH — HEALTH STABILITY: PHYSICAL HEALTH: ON AVERAGE, HOW MANY DAYS PER WEEK DO YOU ENGAGE IN MODERATE TO STRENUOUS EXERCISE (LIKE A BRISK WALK)?: 3 DAYS

## 2024-04-23 SDOH — HEALTH STABILITY: PHYSICAL HEALTH: ON AVERAGE, HOW MANY MINUTES DO YOU ENGAGE IN EXERCISE AT THIS LEVEL?: 40 MIN

## 2024-04-23 ASSESSMENT — SOCIAL DETERMINANTS OF HEALTH (SDOH): HOW OFTEN DO YOU GET TOGETHER WITH FRIENDS OR RELATIVES?: ONCE A WEEK

## 2024-04-24 ENCOUNTER — OFFICE VISIT (OUTPATIENT)
Dept: FAMILY MEDICINE | Facility: CLINIC | Age: 54
End: 2024-04-24
Payer: COMMERCIAL

## 2024-04-24 VITALS
TEMPERATURE: 98.2 F | OXYGEN SATURATION: 100 % | HEART RATE: 85 BPM | SYSTOLIC BLOOD PRESSURE: 103 MMHG | WEIGHT: 131.7 LBS | HEIGHT: 63 IN | BODY MASS INDEX: 23.34 KG/M2 | DIASTOLIC BLOOD PRESSURE: 63 MMHG

## 2024-04-24 DIAGNOSIS — N95.2 ATROPHIC VAGINITIS: ICD-10-CM

## 2024-04-24 DIAGNOSIS — R31.29 MICROHEMATURIA: ICD-10-CM

## 2024-04-24 DIAGNOSIS — N94.10 DYSPAREUNIA IN FEMALE: ICD-10-CM

## 2024-04-24 DIAGNOSIS — Z00.00 ROUTINE GENERAL MEDICAL EXAMINATION AT A HEALTH CARE FACILITY: Primary | ICD-10-CM

## 2024-04-24 DIAGNOSIS — Z71.85 VACCINE COUNSELING: ICD-10-CM

## 2024-04-24 PROCEDURE — 90471 IMMUNIZATION ADMIN: CPT | Performed by: FAMILY MEDICINE

## 2024-04-24 PROCEDURE — 99396 PREV VISIT EST AGE 40-64: CPT | Mod: 25 | Performed by: FAMILY MEDICINE

## 2024-04-24 PROCEDURE — 90651 9VHPV VACCINE 2/3 DOSE IM: CPT | Performed by: FAMILY MEDICINE

## 2024-04-24 RX ORDER — ESTRADIOL 0.1 MG/G
2 CREAM VAGINAL
Qty: 42.5 G | Refills: 1 | Status: SHIPPED | OUTPATIENT
Start: 2024-04-25

## 2024-04-24 RX ORDER — CHOLECALCIFEROL (VITAMIN D3) 50 MCG
1 TABLET ORAL DAILY
COMMUNITY

## 2024-04-24 ASSESSMENT — PAIN SCALES - GENERAL: PAINLEVEL: NO PAIN (0)

## 2024-04-24 NOTE — PROGRESS NOTES
Preventive Care Visit  [facility]  Nia Chavez MD, Family Medicine  Apr 24, 2024      Assessment & Plan     Routine general medical examination at a health care facility  Discussed diet, calcium and exercise.  Thin prep pap was not done.  Eye and dental care UTD or recommended f/u.  Discussed immunization options today- will start Hep B series and complete the HPV series (3rd/final).  See orders below for tests ordered and screening needed.     Microhematuria  UA/BMP done prior to appt and these look good.  Will continue q6mo labs, and send back to urology and/or renal if abnl/concerning.    Atrophic vaginitis  Dyspareunia in female  She has not been sexually active lately, and wanted to check in to getting estrace sent in elsewhere, so hasn't been taking it the last few months.  Hasn't noticed any differences, other sx's being off it.  Interested in getting new rx. [facility]. Will try goodrx coupon.  - estradiol (ESTRACE) 0.1 MG/GM vaginal cream; Place 2 g vaginally twice a week    Vaccine counseling  Risks/benefits discussed, given today.   - HEPATITIS B, ADULT 20+ (ENGERIX-B/RECOMBIVAX HB); Standing  - HPV9 (GARDASIL 9)    Patient has been advised of split billing requirements and indicates understanding: Yes        Counseling  Appropriate preventive services were discussed with this patient, including applicable screening as appropriate for fall prevention, nutrition, physical activity, Tobacco-use cessation, weight loss and cognition.  Checklist reviewing preventive services available has been given to the patient.  Reviewed patient's diet, addressing concerns and/or questions.   She is at risk for lack of exercise and has been provided with information to increase physical activity for the benefit of her well-being.             Burke Reed is a 53 year old, presenting for the following:  Physical        4/24/2024     6:49 AM   Additional  Questions   Roomed by [facility]        Health Care Directive  Patient does not have a Health Care Directive or Living Will: Discussed advance care planning with patient; however, patient declined at this time.    HPI    Dyspareunia- not sexually active lately, and wanted to check in to getting estrace sent in elsewhere, so hasn't been taking it the last few months.  Hasn't noticed any differences, other sx's being off it.  Interested in getting new rx.  Will try goodrx.    Vaccines- Up for getting the third/final HPV and first Hep B today.  Will do shingles at the pharmacy.    Lateral edge of left first toe a bit swollen/firm.  Went to , was training for half-marathon    Reviewed labs-   UA and micro look essentially normal.  BMP normal except for glucose at 111, which is fine, she was not fasting.            4/23/2024   General Health   How would you rate your overall physical health? Good   Feel stress (tense, anxious, or unable to sleep) To some extent   (!) STRESS CONCERN      4/23/2024   Nutrition   Three or more servings of calcium each day? Yes   Diet: Regular (no restrictions)   How many servings of fruit and vegetables per day? 4 or more   How many sweetened beverages each day? 0-1         4/23/2024   Exercise   Days per week of moderate/strenous exercise 3 days   Average minutes spent exercising at this level 40 min         4/23/2024   Social Factors   Frequency of gathering with friends or relatives Once a week   Worry food won't last until get money to buy more No   Food not last or not have enough money for food? No   Do you have housing?  Yes   Are you worried about losing your housing? No   Lack of transportation? No   Unable to get utilities (heat,electricity)? No         4/23/2024   Fall Risk   Fallen 2 or more times in the past year? No   Trouble with walking or balance? No          4/23/2024   Dental   Dentist two times every year? Yes         4/23/2024   TB Screening   Were you born outside of  the [place]? No         Today's PHQ-2 Score:       4/23/2024     2:29 PM   PHQ-2 ( 1999 [employer])   Q1: Little interest or pleasure in doing things 0   Q2: Feeling down, depressed or hopeless 0   PHQ-2 Score 0   Q1: Little interest or pleasure in doing things Not at all   Q2: Feeling down, depressed or hopeless Not at all   PHQ-2 Score 0           4/23/2024   Substance Use   Alcohol more than 3/day or more than 7/wk No   Do you use any other substances recreationally? (!) ALCOHOL    (!) BATH SALTS     Social History     Tobacco Use    Smoking status: Never    Smokeless tobacco: Never   Vaping Use    Vaping status: Never Used   Substance Use Topics    Alcohol use: Yes     Comment: social, 1 drink or less per day    Drug use: No           3/30/2023   LAST FHS-7 RESULTS   1st degree relative breast or ovarian cancer No   Any relative bilateral breast cancer No   Any male have breast cancer No   Any ONE woman have BOTH breast AND ovarian cancer No   Any woman with breast cancer before 50yrs Yes   2 or more relatives with breast AND/OR ovarian cancer Yes   2 or more relatives with breast AND/OR bowel cancer No        Mammogram Screening - Mammogram every 1-2 years updated in Health Maintenance based on mutual decision making        4/23/2024   STI Screening   New sexual partner(s) since last STI/HIV test? No     History of abnormal Pap smear: NO - age 30-65 PAP every 5 years with negative HPV co-testing recommended        Latest Ref Rng & Units 2/14/2023     8:03 AM 7/6/2018    10:57 AM 7/6/2018    10:29 AM   PAP / HPV   PAP  Negative for Intraepithelial Lesion or Malignancy (NILM)      PAP (Historical)    NIL    HPV 16 DNA Negative Negative  Negative     HPV 18 DNA Negative Negative  Negative     Other HR HPV Negative Negative  Negative       ASCVD Risk   The 10-year ASCVD risk score (Nichelle HAYDEN, et al., 2019) is: 0.6%    Values used to calculate the score:      Age: 53 years      Sex: Female      Is Non-  ": No      Diabetic: No      Tobacco smoker: No      Systolic Blood Pressure: 103 mmHg      Is BP treated: No      HDL Cholesterol: 72 mg/dL      Total Cholesterol: 181 mg/dL           Reviewed and updated as needed this visit by Provider   Tobacco  Allergies  Meds  Problems  Med Hx  Surg Hx  Fam Hx              Lab work is in process  Labs reviewed in EPIC      Review of Systems  Constitutional, neuro, ENT, endocrine, pulmonary, cardiac, gastrointestinal, genitourinary, musculoskeletal, integument and psychiatric systems are negative, except as otherwise noted.     Objective    Exam  /63   Pulse 85   Temp 98.2  F (36.8  C) (Temporal)   Ht 1.61 m (5' 3.39\")   Wt 59.7 kg (131 lb 11.2 oz)   LMP  (LMP Unknown)   SpO2 100%   Breastfeeding No   BMI 23.05 kg/m     Estimated body mass index is 23.05 kg/m  as calculated from the following:    Height as of this encounter: 1.61 m (5' 3.39\").    Weight as of this encounter: 59.7 kg (131 lb 11.2 oz).    Physical Exam  GENERAL: alert and no distress  EYES: Eyes grossly normal to inspection, PERRL and conjunctivae and sclerae normal  HENT: ear canals and TM's normal, nose and mouth without ulcers or lesions  NECK: no adenopathy, no asymmetry, masses, or scars  RESP: lungs clear to auscultation - no rales, rhonchi or wheezes  BREAST: normal without masses, tenderness or nipple discharge and no palpable axillary masses or adenopathy  CV: regular rate and rhythm, normal S1 S2, no S3 or S4, no murmur, click or rub, no peripheral edema  ABDOMEN: soft, nontender, no hepatosplenomegaly, no masses and bowel sounds normal  MS: no gross musculoskeletal defects noted, no edema  SKIN: no suspicious lesions or rashes  NEURO: Normal strength and tone, mentation intact and speech normal  PSYCH: mentation appears normal, affect normal/bright        Signed Electronically by: Nia Chavez MD    "

## 2024-04-24 NOTE — PATIENT INSTRUCTIONS
Preventive Care Advice   This is general advice given by our system to help you stay healthy. However, your care team may have specific advice just for you. Please talk to your care team about your preventive care needs.  Nutrition  Eat 5 or more servings of fruits and vegetables each day.  Try wheat bread, brown rice and whole grain pasta (instead of white bread, rice, and pasta).  Get enough calcium and vitamin D. Check the label on foods and aim for 100% of the RDA (recommended daily allowance).  Lifestyle  Exercise at least 150 minutes each week   (30 minutes a day, 5 days a week).  Do muscle strengthening activities 2 days a week. These help control your weight and prevent disease.  No smoking.  Wear sunscreen to prevent skin cancer.  Have a dental exam and cleaning every 6 months.  Yearly exams  See your health care team every year to talk about:  Any changes in your health.  Any medicines your care team has prescribed.  Preventive care, family planning, and ways to prevent chronic diseases.  Shots (vaccines)   HPV shots (up to age 26), if you've never had them before.  Hepatitis B shots (up to age 59), if you've never had them before.  COVID-19 shot: Get this shot when it's due.  Flu shot: Get a flu shot every year.  Tetanus shot: Get a tetanus shot every 10 years.  Pneumococcal, hepatitis A, and RSV shots: Ask your care team if you need these based on your risk.  Shingles shot (for age 50 and up).  General health tests  Diabetes screening:  Starting at age 35, Get screened for diabetes at least every 3 years.  If you are younger than age 35, ask your care team if you should be screened for diabetes.  Cholesterol test: At age 39, start having a cholesterol test every 5 years, or more often if advised.  Bone density scan (DEXA): At age 50, ask your care team if you should have this scan for osteoporosis (brittle bones).  Hepatitis C: Get tested at least once in your life.  STIs (sexually transmitted  infections)  Before age 24: Ask your care team if you should be screened for STIs.  After age 24: Get screened for STIs if you're at risk. You are at risk for STIs (including HIV) if:  You are sexually active with more than one person.  You don't use condoms every time.  You or a partner was diagnosed with a sexually transmitted infection.  If you are at risk for HIV, ask about PrEP medicine to prevent HIV.  Get tested for HIV at least once in your life, whether you are at risk for HIV or not.  Cancer screening tests  Cervical cancer screening: If you have a cervix, begin getting regular cervical cancer screening tests at age 21. Most people who have regular screenings with normal results can stop after age 65. Talk about this with your provider.  Breast cancer scan (mammogram): If you've ever had breasts, begin having regular mammograms starting at age 40. This is a scan to check for breast cancer.  Colon cancer screening: It is important to start screening for colon cancer at age 45.  Have a colonoscopy test every 10 years (or more often if you're at risk) Or, ask your provider about stool tests like a FIT test every year or Cologuard test every 3 years.  To learn more about your testing options, visit: https://[email or web address].  For help making a decision, visit: https://bit.ly/ik17121.  Prostate cancer screening test: If you have a prostate and are age 55 to 69, ask your provider if you would benefit from a yearly prostate cancer screening test.  Lung cancer screening: If you are a current or former smoker age 50 to 80, ask your care team if ongoing lung cancer screenings are right for you.  For informational purposes only. Not to replace the advice of your health care provider. Copyright   2023 [employer]. All rights reserved. Clinically reviewed by the [facility]. [employer] 666289 - REV 01/24.    Learning About Stress  What is stress?     Stress is your  body's response to a hard situation. Your body can have a physical, emotional, or mental response. Stress is a fact of life for most people, and it affects everyone differently. What causes stress for you may not be stressful for someone else.  A lot of things can cause stress. You may feel stress when you go on a job interview, take a test, or run a race. This kind of short-term stress is normal and even useful. It can help you if you need to work hard or react quickly. For example, stress can help you finish an important job on time.  Long-term stress is caused by ongoing stressful situations or events. Examples of long-term stress include long-term health problems, ongoing problems at work, or conflicts in your family. Long-term stress can harm your health.  How does stress affect your health?  When you are stressed, your body responds as though you are in danger. It makes hormones that speed up your heart, make you breathe faster, and give you a burst of energy. This is called the fight-or-flight stress response. If the stress is over quickly, your body goes back to normal and no harm is done.  But if stress happens too often or lasts too long, it can have bad effects. Long-term stress can make you more likely to get sick, and it can make symptoms of some diseases worse. If you tense up when you are stressed, you may develop neck, shoulder, or low back pain. Stress is linked to high blood pressure and heart disease.  Stress also harms your emotional health. It can make you carbone, tense, or depressed. Your relationships may suffer, and you may not do well at work or school.  What can you do to manage stress?  You can try these things to help manage stress:   Do something active. Exercise or activity can help reduce stress. Walking is a great way to get started. Even everyday activities such as housecleaning or yard work can help.  Try yoga or ras chi. These techniques combine exercise and meditation. You may need  some training at first to learn them.  Do something you enjoy. For example, listen to music or go to a movie. Practice your hobby or do volunteer work.  Meditate. This can help you relax, because you are not worrying about what happened before or what may happen in the future.  Do guided imagery. Imagine yourself in any setting that helps you feel calm. You can use online videos, books, or a teacher to guide you.  Do breathing exercises. For example:  From a standing position, bend forward from the waist with your knees slightly bent. Let your arms dangle close to the floor.  Breathe in slowly and deeply as you return to a standing position. Roll up slowly and lift your head last.  Hold your breath for just a few seconds in the standing position.  Breathe out slowly and bend forward from the waist.  Let your feelings out. Talk, laugh, cry, and express anger when you need to. Talking with supportive friends or family, a counselor, or a leana leader about your feelings is a healthy way to relieve stress. Avoid discussing your feelings with people who make you feel worse.  Write. It may help to write about things that are bothering you. This helps you find out how much stress you feel and what is causing it. When you know this, you can find better ways to cope.  What can you do to prevent stress?  You might try some of these things to help prevent stress:  Manage your time. This helps you find time to do the things you want and need to do.  Get enough sleep. Your body recovers from the stresses of the day while you are sleeping.  Get support. Your family, friends, and community can make a difference in how you experience stress.  Limit your news feed. Avoid or limit time on social media or news that may make you feel stressed.  Do something active. Exercise or activity can help reduce stress. Walking is a great way to get started.  Where can you learn more?  Go to https://www.healthwise.net/patiented  Enter N032 in the  "search box to learn more about \"Learning About Stress.\"  Current as of: October 24, 2023               Content Version: 14.0    2297-3988 [employer].   Care instructions adapted under license by your healthcare professional. If you have questions about a medical condition or this instruction, always ask your healthcare professional. [employer] disclaims any warranty or liability for your use of this information.      Substance Use Disorder: Care Instructions  Overview     You can improve your life and health by stopping your use of alcohol or drugs. When you don't drink or use drugs, you may feel and sleep better. You may get along better with your family, friends, and coworkers. There are medicines and programs that can help with substance use disorder.  How can you care for yourself at home?  Here are some ways to help you stay sober and prevent relapse.  If you have been given medicine to help keep you sober or reduce your cravings, be sure to take it exactly as prescribed.  Talk to your doctor about programs that can help you stop using drugs or drinking alcohol.  Do not keep alcohol or drugs in your home.  Plan ahead. Think about what you'll say if other people ask you to drink or use drugs. Try not to spend time with people who drink or use drugs.  Use the time and money spent on drinking or drugs to do something that's important to you.  Preventing a relapse  Have a plan to deal with relapse. Learn to recognize changes in your thinking that lead you to drink or use drugs. Get help before you start to drink or use drugs again.  Try to stay away from situations, friends, or places that may lead you to drink or use drugs.  If you feel the need to drink alcohol or use drugs again, seek help right away. Call a trusted friend or family member. Some people get support from organizations such as Narcotics Anonymous or [employer] or from treatment facilities.  If you relapse, get help " as soon as you can. Some people make a plan with another person that outlines what they want that person to do for them if they relapse. The plan usually includes how to handle the relapse and who to notify in case of relapse.  Don't give up. Remember that a relapse doesn't mean that you have failed. Use the experience to learn the triggers that lead you to drink or use drugs. Then quit again. Recovery is a lifelong process. Many people have several relapses before they are able to quit for good.  Follow-up care is a key part of your treatment and safety. Be sure to make and go to all appointments, and call your doctor if you are having problems. It's also a good idea to know your test results and keep a list of the medicines you take.  When should you call for help?   Call 911  anytime you think you may need emergency care. For example, call if you or someone else:    Has overdosed or has withdrawal signs. Be sure to tell the emergency workers that you are or someone else is using or trying to quit using drugs. Overdose or withdrawal signs may include:  Losing consciousness.  Seizure.  Seeing or hearing things that aren't there (hallucinations).     Is thinking or talking about suicide or harming others.   Where to get help 24 hours a day, 7 days a week   If you or someone you know talks about suicide, self-harm, a mental health crisis, a substance use crisis, or any other kind of emotional distress, get help right away. You can:    Call the Suicide and Crisis Lifeline at 983.     Call 2-631-424-TALK (1-587.573.9152).     Text HOME to 458168 to access the Crisis Text Line.   Consider saving these numbers in your phone.  Go to [employer].org for more information or to chat online.  Call your doctor now or seek immediate medical care if:    You are having withdrawal symptoms. These may include nausea or vomiting, sweating, shakiness, and anxiety.   Watch closely for changes in your health, and be sure to contact  "your doctor if:    You have a relapse.     You need more help or support to stop.   Where can you learn more?  Go to https://www.healthSilk[employer] [place].net/patiented  Enter H573 in the search box to learn more about \"Substance Use Disorder: Care Instructions.\"  Current as of: November 15, 2023               Content Version: 14.0    0814-5420 [employer].   Care instructions adapted under license by your healthcare professional. If you have questions about a medical condition or this instruction, always ask your healthcare professional. Health[employer], [employer] disclaims any warranty or liability for your use of this information.      "

## 2024-06-04 ENCOUNTER — E-VISIT (OUTPATIENT)
Dept: URGENT CARE | Facility: CLINIC | Age: 54
End: 2024-06-04
Payer: COMMERCIAL

## 2024-06-04 DIAGNOSIS — N39.0 ACUTE UTI (URINARY TRACT INFECTION): Primary | ICD-10-CM

## 2024-06-04 PROCEDURE — 99421 OL DIG E/M SVC 5-10 MIN: CPT | Performed by: EMERGENCY MEDICINE

## 2024-06-04 RX ORDER — NITROFURANTOIN 25; 75 MG/1; MG/1
100 CAPSULE ORAL 2 TIMES DAILY
Qty: 10 CAPSULE | Refills: 0 | Status: SHIPPED | OUTPATIENT
Start: 2024-06-04 | End: 2024-06-09

## 2024-06-04 NOTE — PATIENT INSTRUCTIONS
Dear Brianna Lew    After reviewing your responses, I've been able to diagnose you with a urinary tract infection, which is a common infection of the bladder with bacteria.  This is not a sexually transmitted infection, though urinating immediately after intercourse can help prevent infections.  Drinking lots of fluids is also helpful to clear your current infection and prevent the next one.      I have sent a prescription for antibiotics to your pharmacy to treat this infection.    It is important that you take all of your prescribed medication even if your symptoms are improving after a few doses.  Taking all of your medicine helps prevent the symptoms from returning.     If your symptoms worsen, you develop pain in your back or stomach, develop fevers, or are not improving in 5 days, please contact your primary care provider for an appointment or visit any of our convenient Walk-in or Urgent Care Centers to be seen, which can be found on our website here.    Thanks again for choosing us as your health care partner,    Pop Marina MD  Urinary Tract Infection (UTI) in Women: Care Instructions  Overview     A urinary tract infection (UTI) is an infection caused by bacteria. It can happen anywhere in the urinary tract. A UTI can happen in the:  Kidneys.  Ureters, the tubes that connect the kidneys to the bladder.  Bladder.  Urethra, where the urine comes out.  Most UTIs are bladder infections. They often cause pain or burning when you urinate.  Most UTIs can be cured with antibiotics. If you are prescribed antibiotics, be sure to complete your treatment so that the infection does not get worse.  Follow-up care is a key part of your treatment and safety. Be sure to make and go to all appointments, and call your doctor if you are having problems. It's also a good idea to know your test results and keep a list of the medicines you take.  How can you care for yourself at home?  Take your antibiotics as  "directed. Do not stop taking them just because you feel better. You need to take the full course of antibiotics.  Drink extra water and other fluids for the next day or two. This will help make the urine less concentrated and help wash out the bacteria that are causing the infection. (If you have kidney, heart, or liver disease and have to limit fluids, talk with your doctor before you increase the amount of fluids you drink.)  Avoid drinks that are carbonated or have caffeine. They can irritate the bladder.  Urinate often. Try to empty your bladder each time.  To relieve pain, take a hot bath or lay a heating pad set on low over your lower belly or genital area. Never go to sleep with a heating pad in place.  To prevent UTIs  Drink plenty of water each day. This helps you urinate often, which clears bacteria from your system. (If you have kidney, heart, or liver disease and have to limit fluids, talk with your doctor before you increase the amount of fluids you drink.)  Urinate when you need to.  If you are sexually active, urinate right after you have sex.  Change sanitary pads often.  Avoid douches, bubble baths, feminine hygiene sprays, and other feminine hygiene products that have deodorants.  After going to the bathroom, wipe from front to back.  When should you call for help?   Call your doctor now or seek immediate medical care if:    You have new or worse fever, chills, nausea, or vomiting.     You have new pain in your back just below your rib cage. This is called flank pain.     There is new blood or pus in your urine.     You have any problems with your antibiotic medicine.   Watch closely for changes in your health, and be sure to contact your doctor if:    You are not getting better after taking an antibiotic for 2 days.     Your symptoms go away but then come back.   Where can you learn more?  Go to https://www.healthwise.net/patiented  Enter K848 in the search box to learn more about \"Urinary Tract " "Infection (UTI) in Women: Care Instructions.\"  Current as of: November 15, 2023               Content Version: 14.0    2060-6102 [employer].   Care instructions adapted under license by your healthcare professional. If you have questions about a medical condition or this instruction, always ask your healthcare professional. [employer] disclaims any warranty or liability for your use of this information.      "

## 2024-08-27 ENCOUNTER — HOSPITAL ENCOUNTER (OUTPATIENT)
Dept: MAMMOGRAPHY | Facility: CLINIC | Age: 54
Discharge: HOME | End: 2024-08-27
Attending: FAMILY MEDICINE | Admitting: FAMILY MEDICINE
Payer: COMMERCIAL

## 2024-08-27 DIAGNOSIS — Z12.31 VISIT FOR SCREENING MAMMOGRAM: ICD-10-CM

## 2024-08-27 PROCEDURE — 77063 BREAST TOMOSYNTHESIS BI: CPT

## 2024-10-29 ENCOUNTER — LAB (OUTPATIENT)
Dept: LAB | Facility: CLINIC | Age: 54
End: 2024-10-29
Payer: COMMERCIAL

## 2024-10-29 DIAGNOSIS — R31.29 MICROHEMATURIA: ICD-10-CM

## 2024-10-29 DIAGNOSIS — N29 NEPHROCALCINOSIS: ICD-10-CM

## 2024-10-29 DIAGNOSIS — E83.59 NEPHROCALCINOSIS: ICD-10-CM

## 2024-10-29 LAB
ALBUMIN UR-MCNC: NEGATIVE MG/DL
ANION GAP SERPL CALCULATED.3IONS-SCNC: 12 MMOL/L (ref 7–15)
APPEARANCE UR: CLEAR
BACTERIA #/AREA URNS HPF: ABNORMAL /HPF
BILIRUB UR QL STRIP: NEGATIVE
BUN SERPL-MCNC: 15.9 MG/DL (ref 6–20)
CALCIUM SERPL-MCNC: 9.6 MG/DL (ref 8.8–10.4)
CHLORIDE SERPL-SCNC: 99 MMOL/L (ref 98–107)
COLOR UR AUTO: YELLOW
CREAT SERPL-MCNC: 0.83 MG/DL (ref 0.51–0.95)
EGFRCR SERPLBLD CKD-EPI 2021: 84 ML/MIN/1.73M2
GLUCOSE SERPL-MCNC: 109 MG/DL (ref 70–99)
GLUCOSE UR STRIP-MCNC: NEGATIVE MG/DL
HCO3 SERPL-SCNC: 25 MMOL/L (ref 22–29)
HGB UR QL STRIP: ABNORMAL
KETONES UR STRIP-MCNC: NEGATIVE MG/DL
LEUKOCYTE ESTERASE UR QL STRIP: ABNORMAL
NITRATE UR QL: NEGATIVE
PH UR STRIP: 6.5 [PH] (ref 5–7)
POTASSIUM SERPL-SCNC: 4.1 MMOL/L (ref 3.4–5.3)
RBC #/AREA URNS AUTO: ABNORMAL /HPF
SODIUM SERPL-SCNC: 136 MMOL/L (ref 135–145)
SP GR UR STRIP: 1.01 (ref 1–1.03)
UROBILINOGEN UR STRIP-ACNC: 0.2 E.U./DL
WBC #/AREA URNS AUTO: ABNORMAL /HPF

## 2024-10-29 PROCEDURE — 81001 URINALYSIS AUTO W/SCOPE: CPT

## 2024-10-29 PROCEDURE — 80048 BASIC METABOLIC PNL TOTAL CA: CPT

## 2024-10-29 PROCEDURE — 36415 COLL VENOUS BLD VENIPUNCTURE: CPT

## 2024-10-30 ENCOUNTER — VIRTUAL VISIT (OUTPATIENT)
Dept: FAMILY MEDICINE | Facility: CLINIC | Age: 54
End: 2024-10-30
Attending: FAMILY MEDICINE
Payer: COMMERCIAL

## 2024-10-30 DIAGNOSIS — Z13.6 CARDIOVASCULAR SCREENING; LDL GOAL LESS THAN 160: ICD-10-CM

## 2024-10-30 DIAGNOSIS — R73.09 ELEVATED GLUCOSE: ICD-10-CM

## 2024-10-30 DIAGNOSIS — R31.29 MICROHEMATURIA: Primary | ICD-10-CM

## 2024-10-30 DIAGNOSIS — E83.59 NEPHROCALCINOSIS: ICD-10-CM

## 2024-10-30 DIAGNOSIS — N29 NEPHROCALCINOSIS: ICD-10-CM

## 2024-10-30 DIAGNOSIS — H93.8X9 SENSATION OF FULLNESS IN EAR, UNSPECIFIED LATERALITY: ICD-10-CM

## 2024-10-30 PROCEDURE — 99214 OFFICE O/P EST MOD 30 MIN: CPT | Mod: 95 | Performed by: FAMILY MEDICINE

## 2024-10-30 ASSESSMENT — ANXIETY QUESTIONNAIRES
5. BEING SO RESTLESS THAT IT IS HARD TO SIT STILL: NOT AT ALL
GAD7 TOTAL SCORE: 0
8. IF YOU CHECKED OFF ANY PROBLEMS, HOW DIFFICULT HAVE THESE MADE IT FOR YOU TO DO YOUR WORK, TAKE CARE OF THINGS AT HOME, OR GET ALONG WITH OTHER PEOPLE?: NOT DIFFICULT AT ALL
4. TROUBLE RELAXING: NOT AT ALL
3. WORRYING TOO MUCH ABOUT DIFFERENT THINGS: NOT AT ALL
6. BECOMING EASILY ANNOYED OR IRRITABLE: NOT AT ALL
1. FEELING NERVOUS, ANXIOUS, OR ON EDGE: NOT AT ALL
IF YOU CHECKED OFF ANY PROBLEMS ON THIS QUESTIONNAIRE, HOW DIFFICULT HAVE THESE PROBLEMS MADE IT FOR YOU TO DO YOUR WORK, TAKE CARE OF THINGS AT HOME, OR GET ALONG WITH OTHER PEOPLE: NOT DIFFICULT AT ALL
GAD7 TOTAL SCORE: 0
7. FEELING AFRAID AS IF SOMETHING AWFUL MIGHT HAPPEN: NOT AT ALL
7. FEELING AFRAID AS IF SOMETHING AWFUL MIGHT HAPPEN: NOT AT ALL
GAD7 TOTAL SCORE: 0
2. NOT BEING ABLE TO STOP OR CONTROL WORRYING: NOT AT ALL

## 2024-10-30 ASSESSMENT — PATIENT HEALTH QUESTIONNAIRE - PHQ9
10. IF YOU CHECKED OFF ANY PROBLEMS, HOW DIFFICULT HAVE THESE PROBLEMS MADE IT FOR YOU TO DO YOUR WORK, TAKE CARE OF THINGS AT HOME, OR GET ALONG WITH OTHER PEOPLE: NOT DIFFICULT AT ALL
SUM OF ALL RESPONSES TO PHQ QUESTIONS 1-9: 0
SUM OF ALL RESPONSES TO PHQ QUESTIONS 1-9: 0

## 2024-10-30 NOTE — PROGRESS NOTES
Brianna is a 53 year old who is being evaluated via a billable video visit.    How would you like to obtain your AVS? MyChart  If the video visit is dropped, the invitation should be resent by: Text to cell phone: [phone]  Will anyone else be joining your video visit? No      Assessment & Plan       ICD-10-CM    1. Microhematuria  R31.29 Basic metabolic panel  (Ca, Cl, CO2, Creat, Gluc, K, Na, BUN)     Albumin Random Urine Quantitative with Creat Ratio     UA with Microscopic reflex to Culture - lab collect     PRIMARY CARE FOLLOW-UP SCHEDULING      2. Nephrocalcinosis  E83.59 Basic metabolic panel  (Ca, Cl, CO2, Creat, Gluc, K, Na, BUN)    N29 Albumin Random Urine Quantitative with Creat Ratio     UA with Microscopic reflex to Culture - lab collect     PRIMARY CARE FOLLOW-UP SCHEDULING      3. CARDIOVASCULAR SCREENING; LDL GOAL LESS THAN 160  Z13.6 Lipid panel reflex to direct LDL Fasting     PRIMARY CARE FOLLOW-UP SCHEDULING      4. Elevated glucose  R73.09 Hemoglobin A1c     PRIMARY CARE FOLLOW-UP SCHEDULING      5. Sensation of fullness in ear, unspecified laterality  H93.8X9          52yo doing her q6mo follow-up for urology/nephrology labs- UA and BMP.  Both look stable- still with small amount of RBCs, but does not need to be sent back to specialists unless worsening RBC's or hematuria.  Pt reports one episode of hematuria with UTI- just once, right at time of first dose of abx.  GFR 84, so a bit down from the usual >90, but still very good.  Glucose was slightly elevated at 107, and she was fasting, but she reports she had only slept ~3 hrs the night prior and was feeling horrible.    Viral URI/ear fullness- rec afrin prior to flights if ear sx's/URI.    Will continue monitoring these labs every six months- next time prior to her physical.  Will add yearly fasting labs/lipids to these, and add microalbumin (high at one time, but also unusual circumstances and recheck was normal).       Follow-up Visit    Expected date:  Apr 30, 2025 (Approximate)      Follow Up Appointment Details:     Follow-up with whom?: Me    Follow-Up for what?: Adult Preventive    Any Additional Chronic Condition Management?: General (Other)    Additional Details: UA/BMP/microhematuria 6 month follow-up    How?: In Person                                 Subjective   Brianna is a 53 year old, presenting for the following health issues:  No chief complaint on file.        10/30/2024    11:32 AM   Additional Questions   Roomed by Shila BHAT   Accompanied by self       History of Present Illness       Reason for visit:  Discuss lab results from yesterday        In [place], home Sat.  Was in [facility].  Was taking unisom during trip and when she got back one night, but not the night prior.  Only slept 3 hrs the emerita the night prior to labs.  When at home, usually doesn't use it.  Will try again to go off it this weekend- uses 1/2 tab.  Does dehydrated her.    Got a cold, flying back, really uncomfortable with her ears.    Wonders if the only sleeping 3 hrs and feeling horrible may have affected the labs- especially the elevated glucose (was fasting).    Reviewed labs from yesterday - look good/stable.  Still with 2-5 RBCs, no WBCs.  GFR 84, so just a bit down, but still quite stable.      Couple months ago, she did have a UTI, and she did see a little bit of blood at that time (right immediately after she took her fist abx pill).  No blood in urine seen before or after that time.      Reviewed consults  '22 urology and '21 nephrology  Both rec q6mo UA/BMP, with no end point.      Finally went to the eye doctor- looked good, got progressives.      Patient Active Problem List   Diagnosis    Primary localized osteoarthrosis, hand    CARDIOVASCULAR SCREENING; LDL GOAL LESS THAN 160    Female infertility of other specified origin    Microhematuria    Dyspareunia in female    Atrophic vaginitis    Vaginal bleeding    Nephrocalcinosis      Current  "Outpatient Medications   Medication Sig Dispense Refill    estradiol (ESTRACE) 0.1 MG/GM vaginal cream Place 2 g vaginally twice a week 42.5 g 1    multivitamin (CENTRUM SILVER) tablet Take 1 tablet by mouth daily      vitamin D3 (CHOLECALCIFEROL) 50 mcg (2000 units) tablet Take 1 tablet by mouth daily            Review of Systems  Constitutional, neuro, ENT, endocrine, pulmonary, cardiac, gastrointestinal, genitourinary, musculoskeletal, integument and psychiatric systems are negative, except as otherwise noted.      Objective    Vitals - Patient Reported  Weight (Patient Reported): 57.6 kg (127 lb)  Height (Patient Reported): 162.6 cm (5' 4\")  BMI (Based on Pt Reported Ht/Wt): 21.8        Physical Exam   GENERAL: alert and no distress  EYES: Eyes grossly normal to inspection.  No discharge or erythema, or obvious scleral/conjunctival abnormalities.  RESP: No audible wheeze, cough, or visible cyanosis.    SKIN: Visible skin clear. No significant rash, abnormal pigmentation or lesions.  NEURO: Cranial nerves grossly intact.  Mentation and speech appropriate for age.  PSYCH: Appropriate affect, tone, and pace of words      Lab on 10/29/2024   Component Date Value Ref Range Status    Sodium 10/29/2024 136  135 - 145 mmol/L Final    Potassium 10/29/2024 4.1  3.4 - 5.3 mmol/L Final    Chloride 10/29/2024 99  98 - 107 mmol/L Final    Carbon Dioxide (CO2) 10/29/2024 25  22 - 29 mmol/L Final    Anion Gap 10/29/2024 12  7 - 15 mmol/L Final    Urea Nitrogen 10/29/2024 15.9  6.0 - 20.0 mg/dL Final    Creatinine 10/29/2024 0.83  0.51 - 0.95 mg/dL Final    GFR Estimate 10/29/2024 84  >60 mL/min/1.73m2 Final    eGFR calculated using 2021 CKD-EPI equation.    Calcium 10/29/2024 9.6  8.8 - 10.4 mg/dL Final    Reference intervals for this test were updated on 7/16/2024 to reflect our healthy population more accurately. There may be differences in the flagging of prior results with similar values performed with this method. Those " prior results can be interpreted in the context of the updated reference intervals.    Glucose 10/29/2024 109 (H)  70 - 99 mg/dL Final    Color Urine 10/29/2024 Yellow  Colorless, Straw, Light Yellow, Yellow Final    Appearance Urine 10/29/2024 Clear  Clear Final    Glucose Urine 10/29/2024 Negative  Negative mg/dL Final    Bilirubin Urine 10/29/2024 Negative  Negative Final    Ketones Urine 10/29/2024 Negative  Negative mg/dL Final    Specific Gravity Urine 10/29/2024 1.010  1.003 - 1.035 Final    Blood Urine 10/29/2024 Moderate (A)  Negative Final    pH Urine 10/29/2024 6.5  5.0 - 7.0 Final    Protein Albumin Urine 10/29/2024 Negative  Negative mg/dL Final    Urobilinogen Urine 10/29/2024 0.2  0.2, 1.0 E.U./dL Final    Nitrite Urine 10/29/2024 Negative  Negative Final    Leukocyte Esterase Urine 10/29/2024 Small (A)  Negative Final    Bacteria Urine 10/29/2024 Few (A)  None Seen /HPF Final    RBC Urine 10/29/2024 5-10 (A)  0-2 /HPF /HPF Final    WBC Urine 10/29/2024 0-5  0-5 /HPF /HPF Final     No results found for any visits on 10/30/24.        Video-Visit Details    Type of service:  Video Visit   Originating Location (pt. Location): Home    Distant Location (provider location):  On-site  Platform used for Video Visit: Coy  Signed Electronically by: Nia Chavez MD

## 2025-04-14 ENCOUNTER — TELEPHONE (OUTPATIENT)
Dept: FAMILY MEDICINE | Facility: CLINIC | Age: 55
End: 2025-04-14
Payer: COMMERCIAL

## 2025-04-14 DIAGNOSIS — Z71.85 VACCINE COUNSELING: Primary | ICD-10-CM

## 2025-04-14 NOTE — TELEPHONE ENCOUNTER
Order/Referral Request    Who is requesting: Patient    Orders being requested: MMR immunity test:    Reason service is needed/diagnosis: [place] going out of [place] and would like to check for immunity    When are orders needed by: asap    Has this been discussed with Provider: No    Does patient have a preference on a Group/Provider/Facility? [facility]    Does patient have an appointment scheduled?: No    Where to send orders: Place orders within Epic    [facility] or would you prefer to receive a phone call?:   No preference   Okay to leave a detailed message?: Yes at Cell number on file:    Telephone Information:   Mobile [phone]

## 2025-04-14 NOTE — TELEPHONE ENCOUNTER
CW,  Please see below Timeethart message and advise.  Pended if approved  Thanks,  Melinda CAMILO RN

## 2025-04-15 NOTE — TELEPHONE ENCOUNTER
MMR titers ordered.  Please remind pt she is due for physical with me very soon (last 4/24/24)- please schedule it now/asap, likely when my schedule is open in a few months.  Thanks- CW

## 2025-04-17 ENCOUNTER — LAB (OUTPATIENT)
Dept: LAB | Facility: CLINIC | Age: 55
End: 2025-04-17
Payer: COMMERCIAL

## 2025-04-17 DIAGNOSIS — Z71.85 VACCINE COUNSELING: ICD-10-CM

## 2025-04-21 ENCOUNTER — MYC MEDICAL ADVICE (OUTPATIENT)
Dept: FAMILY MEDICINE | Facility: CLINIC | Age: 55
End: 2025-04-21
Payer: COMMERCIAL

## 2025-04-21 NOTE — TELEPHONE ENCOUNTER
CW,  Please see below MyChart message and advise.  Appears titer shows immunity  Thanks,  Melinda CAMILO RN

## 2025-05-05 SDOH — HEALTH STABILITY: PHYSICAL HEALTH: ON AVERAGE, HOW MANY MINUTES DO YOU ENGAGE IN EXERCISE AT THIS LEVEL?: 40 MIN

## 2025-05-05 SDOH — HEALTH STABILITY: PHYSICAL HEALTH: ON AVERAGE, HOW MANY DAYS PER WEEK DO YOU ENGAGE IN MODERATE TO STRENUOUS EXERCISE (LIKE A BRISK WALK)?: 3 DAYS

## 2025-05-05 ASSESSMENT — SOCIAL DETERMINANTS OF HEALTH (SDOH): HOW OFTEN DO YOU GET TOGETHER WITH FRIENDS OR RELATIVES?: TWICE A WEEK

## 2025-05-06 ENCOUNTER — OFFICE VISIT (OUTPATIENT)
Dept: FAMILY MEDICINE | Facility: CLINIC | Age: 55
End: 2025-05-06
Payer: COMMERCIAL

## 2025-05-06 VITALS
HEIGHT: 65 IN | DIASTOLIC BLOOD PRESSURE: 72 MMHG | HEART RATE: 92 BPM | OXYGEN SATURATION: 99 % | RESPIRATION RATE: 16 BRPM | TEMPERATURE: 98.1 F | BODY MASS INDEX: 21.73 KG/M2 | WEIGHT: 130.4 LBS | SYSTOLIC BLOOD PRESSURE: 120 MMHG

## 2025-05-06 DIAGNOSIS — R31.29 MICROHEMATURIA: ICD-10-CM

## 2025-05-06 DIAGNOSIS — L98.9 SKIN LESION: ICD-10-CM

## 2025-05-06 DIAGNOSIS — N95.2 ATROPHIC VAGINITIS: ICD-10-CM

## 2025-05-06 DIAGNOSIS — N94.10 DYSPAREUNIA IN FEMALE: ICD-10-CM

## 2025-05-06 DIAGNOSIS — Z00.00 ROUTINE GENERAL MEDICAL EXAMINATION AT A HEALTH CARE FACILITY: Primary | ICD-10-CM

## 2025-05-06 PROCEDURE — 3074F SYST BP LT 130 MM HG: CPT | Performed by: FAMILY MEDICINE

## 2025-05-06 PROCEDURE — 99396 PREV VISIT EST AGE 40-64: CPT | Performed by: FAMILY MEDICINE

## 2025-05-06 PROCEDURE — 3078F DIAST BP <80 MM HG: CPT | Performed by: FAMILY MEDICINE

## 2025-05-06 PROCEDURE — 1126F AMNT PAIN NOTED NONE PRSNT: CPT | Performed by: FAMILY MEDICINE

## 2025-05-06 RX ORDER — ESTRADIOL 0.1 MG/G
2 CREAM VAGINAL
Qty: 42.5 G | Refills: 1 | Status: SHIPPED | OUTPATIENT
Start: 2025-05-08

## 2025-05-06 ASSESSMENT — ANXIETY QUESTIONNAIRES
5. BEING SO RESTLESS THAT IT IS HARD TO SIT STILL: NOT AT ALL
IF YOU CHECKED OFF ANY PROBLEMS ON THIS QUESTIONNAIRE, HOW DIFFICULT HAVE THESE PROBLEMS MADE IT FOR YOU TO DO YOUR WORK, TAKE CARE OF THINGS AT HOME, OR GET ALONG WITH OTHER PEOPLE: NOT DIFFICULT AT ALL
1. FEELING NERVOUS, ANXIOUS, OR ON EDGE: SEVERAL DAYS
7. FEELING AFRAID AS IF SOMETHING AWFUL MIGHT HAPPEN: NOT AT ALL
6. BECOMING EASILY ANNOYED OR IRRITABLE: NOT AT ALL
2. NOT BEING ABLE TO STOP OR CONTROL WORRYING: NOT AT ALL
3. WORRYING TOO MUCH ABOUT DIFFERENT THINGS: NOT AT ALL
4. TROUBLE RELAXING: NOT AT ALL
8. IF YOU CHECKED OFF ANY PROBLEMS, HOW DIFFICULT HAVE THESE MADE IT FOR YOU TO DO YOUR WORK, TAKE CARE OF THINGS AT HOME, OR GET ALONG WITH OTHER PEOPLE?: NOT DIFFICULT AT ALL
GAD7 TOTAL SCORE: 1
GAD7 TOTAL SCORE: 1
7. FEELING AFRAID AS IF SOMETHING AWFUL MIGHT HAPPEN: NOT AT ALL
GAD7 TOTAL SCORE: 1

## 2025-05-06 ASSESSMENT — PAIN SCALES - GENERAL: PAINLEVEL_OUTOF10: NO PAIN (0)

## 2025-05-06 NOTE — PATIENT INSTRUCTIONS
Patient Education   Preventive Care Advice   This is general advice given by our system to help you stay healthy. However, your care team may have specific advice just for you. Please talk to your care team about your preventive care needs.  Nutrition  Eat 5 or more servings of fruits and vegetables each day.  Try wheat bread, brown rice and whole grain pasta (instead of white bread, rice, and pasta).  Get enough calcium and vitamin D. Check the label on foods and aim for 100% of the RDA (recommended daily allowance).  Lifestyle  Exercise at least 150 minutes each week  (30 minutes a day, 5 days a week).  Do muscle strengthening activities 2 days a week. These help control your weight and prevent disease.  No smoking.  Wear sunscreen to prevent skin cancer.  Have a dental exam and cleaning every 6 months.  Yearly exams  See your health care team every year to talk about:  Any changes in your health.  Any medicines your care team has prescribed.  Preventive care, family planning, and ways to prevent chronic diseases.  Shots (vaccines)   HPV shots (up to age 26), if you've never had them before.  Hepatitis B shots (up to age 59), if you've never had them before.  COVID-19 shot: Get this shot when it's due.  Flu shot: Get a flu shot every year.  Tetanus shot: Get a tetanus shot every 10 years.  Pneumococcal, hepatitis A, and RSV shots: Ask your care team if you need these based on your risk.  Shingles shot (for age 50 and up)  General health tests  Diabetes screening:  Starting at age 35, Get screened for diabetes at least every 3 years.  If you are younger than age 35, ask your care team if you should be screened for diabetes.  Cholesterol test: At age 39, start having a cholesterol test every 5 years, or more often if advised.  Bone density scan (DEXA): At age 50, ask your care team if you should have this scan for osteoporosis (brittle bones).  Hepatitis C: Get tested at least once in your life.  STIs (sexually  transmitted infections)  Before age 24: Ask your care team if you should be screened for STIs.  After age 24: Get screened for STIs if you're at risk. You are at risk for STIs (including HIV) if:  You are sexually active with more than one person.  You don't use condoms every time.  You or a partner was diagnosed with a sexually transmitted infection.  If you are at risk for HIV, ask about PrEP medicine to prevent HIV.  Get tested for HIV at least once in your life, whether you are at risk for HIV or not.  Cancer screening tests  Cervical cancer screening: If you have a cervix, begin getting regular cervical cancer screening tests starting at age 21.  Breast cancer scan (mammogram): If you've ever had breasts, begin having regular mammograms starting at age 40. This is a scan to check for breast cancer.  Colon cancer screening: It is important to start screening for colon cancer at age 45.  Have a colonoscopy test every 10 years (or more often if you're at risk) Or, ask your provider about stool tests like a FIT test every year or Cologuard test every 3 years.  To learn more about your testing options, visit:   .  For help making a decision, visit:   https://bit.ly/ix60054.  Prostate cancer screening test: If you have a prostate, ask your care team if a prostate cancer screening test (PSA) at age 55 is right for you.  Lung cancer screening: If you are a current or former smoker ages 50 to 80, ask your care team if ongoing lung cancer screenings are right for you.  For informational purposes only. Not to replace the advice of your health care provider. Copyright   2023 [facility]. All rights reserved. Clinically reviewed by the [facility]. [employer] 283962 - REV 01/24.  Learning About Stress  What is stress?     Stress is your body's response to a hard situation. Your body can have a physical, emotional, or mental response. Stress is a fact of life for most people, and it  affects everyone differently. What causes stress for you may not be stressful for someone else.  A lot of things can cause stress. You may feel stress when you go on a job interview, take a test, or run a race. This kind of short-term stress is normal and even useful. It can help you if you need to work hard or react quickly. For example, stress can help you finish an important job on time.  Long-term stress is caused by ongoing stressful situations or events. Examples of long-term stress include long-term health problems, ongoing problems at work, or conflicts in your family. Long-term stress can harm your health.  How does stress affect your health?  When you are stressed, your body responds as though you are in danger. It makes hormones that speed up your heart, make you breathe faster, and give you a burst of energy. This is called the fight-or-flight stress response. If the stress is over quickly, your body goes back to normal and no harm is done.  But if stress happens too often or lasts too long, it can have bad effects. Long-term stress can make you more likely to get sick, and it can make symptoms of some diseases worse. If you tense up when you are stressed, you may develop neck, shoulder, or low back pain. Stress is linked to high blood pressure and heart disease.  Stress also harms your emotional health. It can make you carbone, tense, or depressed. Your relationships may suffer, and you may not do well at work or school.  What can you do to manage stress?  You can try these things to help manage stress:   Do something active. Exercise or activity can help reduce stress. Walking is a great way to get started. Even everyday activities such as housecleaning or yard work can help.  Try yoga or ras chi. These techniques combine exercise and meditation. You may need some training at first to learn them.  Do something you enjoy. For example, listen to music or go to a movie. Practice your hobby or do volunteer  "work.  Meditate. This can help you relax, because you are not worrying about what happened before or what may happen in the future.  Do guided imagery. Imagine yourself in any setting that helps you feel calm. You can use online videos, books, or a teacher to guide you.  Do breathing exercises. For example:  From a standing position, bend forward from the waist with your knees slightly bent. Let your arms dangle close to the floor.  Breathe in slowly and deeply as you return to a standing position. Roll up slowly and lift your head last.  Hold your breath for just a few seconds in the standing position.  Breathe out slowly and bend forward from the waist.  Let your feelings out. Talk, laugh, cry, and express anger when you need to. Talking with supportive friends or family, a counselor, or a leana leader about your feelings is a healthy way to relieve stress. Avoid discussing your feelings with people who make you feel worse.  Write. It may help to write about things that are bothering you. This helps you find out how much stress you feel and what is causing it. When you know this, you can find better ways to cope.  What can you do to prevent stress?  You might try some of these things to help prevent stress:  Manage your time. This helps you find time to do the things you want and need to do.  Get enough sleep. Your body recovers from the stresses of the day while you are sleeping.  Get support. Your family, friends, and community can make a difference in how you experience stress.  Limit your news feed. Avoid or limit time on social media or news that may make you feel stressed.  Do something active. Exercise or activity can help reduce stress. Walking is a great way to get started.  Where can you learn more?  Go to https://www.[employer].net/patiented  Enter N032 in the search box to learn more about \"Learning About Stress.\"  Current as of: October 24, 2024  Content Version: 14.4 2024-2025 [employer].   Care instructions adapted under license by your healthcare professional. If you have questions about a medical condition or this instruction, always ask your healthcare professional. [employer] disclaims any warranty or liability for your use of this information.

## 2025-05-06 NOTE — PROGRESS NOTES
Preventive Care Visit  [facility]  Nia Chavez MD, Family Medicine  May 6, 2025        Assessment & Plan     Routine general medical examination at a health care facility  Discussed diet, calcium and exercise.  Thin prep pap was not done.  Eye and dental care UTD or recommended f/u.  No immunizations needed today.  See orders below for tests ordered and screening needed.       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  Not sexually active, not dating, though hopes to at some point.  Motivated to try it after an upcoming trip.  Urologist thought it was a good thing to use for prevention as well.  New refill sent. Risks and benefits of medication(s) including potential side effects reviewed with patient.  Questions answered.   - estradiol (ESTRACE) 0.1 MG/GM vaginal cream; Place 2 g vaginally twice a week.    Microhematuria  Due for labs- UA and BMP, but is not fasting today, so will rtc when she can get all her labs.  Contnue q6mo UA/BMPs, with return to urology if worsening hematuria or gross hematuria.    Skin lesion  [facility] after concerns at current dermatologist.  Reassured her her hand today shows no concerning signs.  Rec hydrocortisone OTC - to use 2x/day for 1 wk, and continue good moisturizing.    Patient has been advised of split billing requirements and indicates understanding: Yes        Counseling  Appropriate preventive services were addressed with this patient via screening, questionnaire, or discussion as appropriate for fall prevention, nutrition, physical activity, Tobacco-use cessation, social engagement, weight loss and cognition.  Checklist reviewing preventive services available has been given to the patient.  Reviewed patient's diet, addressing concerns and/or questions.   She is at risk for lack of exercise and has been provided with information to increase physical activity for the  benefit of her well-being.   She is at risk for psychosocial distress and has been provided with information to reduce risk.       Follow-up    Follow-up Visit   Expected date:  May 06, 2026 (Approximate)      Follow Up Appointment Details:     Follow-up with whom?: PCP    Follow-Up for what?: Adult Preventive    How?: In Person                   Subjective   Brianna is a 54 year old, presenting for the following:  Physical        5/6/2025     1:26 PM   Additional Questions   Roomed by Aster NEWTON    Heading to [place] - 5/15/25 with friends.  Got her shingles shot.  Got MMR titers- all good.  Tried to get labs, but couldn't get scheduled in time.  Has one for Thur    Went to dermatology in Jan- [facility]   Dry spots on her nose.  No dermatoscope or light used.  She said maybe pre-cancerous -froze it, went back when pt thought she had another one.  She froze that one, too.  First one bubbled up and flaked off, other one is now smoother.  Wondering about a small bump on her hand- can't see anything there.      Super stressed at work.  Just found out her boss is leaving. She's been there x 6 months, and her boss is now leaving in less than 2 wks. They will hire, but in the meantime, they'll need to cover for him. He has so much knowledge the rest of them don't have.  She's been working 12-14 hrs/day for a few months prior- finally was going to get a break- system went live. Now this new wrench which will worsening things again significantly.  4 other people at [employer], her peers, 6-7 direct reports.    Started doing Sauna- sauna strong in [facility].  Super relaxing- signed up for a monthly membership- tries to go 3d/wk, 45 min.  Dry finish, cool dip, back in, cool dip, last 5 min in infra red.    Diet/exercise/sleep- okay.  Trained for [employer] last summer- likely why wt was back down.  Then realized at some point afterwards, she was back up to 130 lbs.  Wants to exercise 5d/wk, but  usually ~2d/wk lately.  Best combo- running with [employer].  Diet- tries to be more conscious of how often she orders take-out.  Easy if she's working too much.    Sleeping better after more sauna, though didn't last night- just 5 hrs.        Answers submitted by the patient for this visit:  Patient Health Questionnaire (Submitted on 5/6/2025)  If you checked off any problems, how difficult have these problems made it for you to do your work, take care of things at home, or get along with other people?: Not difficult at all  PHQ9 TOTAL SCORE: 0  Patient Health Questionnaire (G7) (Submitted on 5/6/2025)  ETHAN 7 TOTAL SCORE: 1       Advance Care Planning    Discussed advance care planning with patient; informed AVS has link to [facility] [employer].        5/5/2025   General Health   How would you rate your overall physical health? Excellent   Feel stress (tense, anxious, or unable to sleep) To some extent   (!) STRESS CONCERN      5/5/2025   Nutrition   Three or more servings of calcium each day? Yes   Diet: Regular (no restrictions)   How many servings of fruit and vegetables per day? 4 or more   How many sweetened beverages each day? 0-1         5/5/2025   Exercise   Days per week of moderate/strenous exercise 3 days   Average minutes spent exercising at this level 40 min         5/5/2025   Social Factors   Frequency of gathering with friends or relatives Twice a week   Worry food won't last until get money to buy more No   Food not last or not have enough money for food? No   Do you have housing? (Housing is defined as stable permanent housing and does not include staying outside in a car, in a tent, in an abandoned building, in an overnight shelter, or couch-surfing.) Yes   Are you worried about losing your housing? No   Lack of transportation? No   Unable to get utilities (heat,electricity)? No         5/5/2025   Fall Risk   Fallen 2 or more times in the past year? No   Trouble with walking or balance? No           5/5/2025   Dental   Dentist two times every year? Yes       Today's PHQ-9 Score:       5/6/2025     1:22 PM   PHQ-9 SCORE   PHQ-9 Total Score MyChart 0   PHQ-9 Total Score 0        Patient-reported         5/5/2025   Substance Use   Alcohol more than 3/day or more than 7/wk No   Do you use any other substances recreationally? No     Social History     Tobacco Use    Smoking status: Never     Passive exposure: Never    Smokeless tobacco: Never   Vaping Use    Vaping status: Never Used   Substance Use Topics    Alcohol use: Yes     Comment: social, 1 drink or less per day    Drug use: No           8/27/2024   LAST FHS-7 RESULTS   1st degree relative breast or ovarian cancer No   Any relative bilateral breast cancer No   Any male have breast cancer No   Any ONE woman have BOTH breast AND ovarian cancer No        Mammogram Screening - Mammogram every 1-2 years updated in Health Maintenance based on mutual decision making        5/5/2025   STI Screening   New sexual partner(s) since last STI/HIV test? No     History of abnormal Pap smear: No - age 30- 64 PAP with HPV every 5 years recommended        Latest Ref Rng & Units 2/14/2023     8:03 AM 7/6/2018    10:57 AM 7/6/2018    10:29 AM   PAP / HPV   PAP  Negative for Intraepithelial Lesion or Malignancy (NILM)      PAP (Historical)    NIL    HPV 16 DNA Negative Negative  Negative     HPV 18 DNA Negative Negative  Negative     Other HR HPV Negative Negative  Negative       ASCVD Risk   The 10-year ASCVD risk score (Nichelle HAYDEN, et al., 2019) is: 1.1%    Values used to calculate the score:      Age: 54 years      Sex: Female      Is Non- : No      Diabetic: No      Tobacco smoker: No      Systolic Blood Pressure: 120 mmHg      Is BP treated: No      HDL Cholesterol: 83 mg/dL      Total Cholesterol: 232 mg/dL           Reviewed and updated as needed this visit by Provider   Tobacco  Allergies  Meds  Problems  Med Hx  Surg Hx  Fam Hx     "        Lab work is in process  Labs reviewed in EPIC  BP Readings from Last 3 Encounters:   05/06/25 120/72   04/24/24 103/63   06/10/23 132/72    Wt Readings from Last 3 Encounters:   05/06/25 59.1 kg (130 lb 6.4 oz)   04/24/24 59.7 kg (131 lb 11.2 oz)   06/10/23 56.2 kg (123 lb 12.8 oz)                  Recent Labs   Lab Test 05/08/25  0821 10/29/24  0743 04/22/24  1052 09/14/23  0856 06/29/23  0830 12/16/22  0823 05/23/22  0810 10/06/21  0957 02/17/21  0845 12/10/19  0750 11/14/19  0832   A1C 5.5  --   --   --   --   --   --   --  5.4  --   --    *  --   --  93  --   --  88  --  114*   < >  --    HDL 83  --   --  72  --   --  73  --  73   < >  --    TRIG 114  --   --  81 108  --  66  --  88   < >  --    ALT  --   --   --   --   --   --   --   --   --   --  36   CR 0.79 0.83   < > 0.75  --    < > 0.66   < > 0.77  --  0.72   GFRESTIMATED 89 84   < > >90  --    < > >90   < > >90  --  >90   GFRESTBLACK  --   --   --   --   --   --   --   --  >90  --  >90   POTASSIUM 4.1 4.1   < > 4.2  --    < > 3.8   < > 4.0  --  3.4   TSH  --   --   --   --   --   --  3.24  --   --   --   --     < > = values in this interval not displayed.          Review of Systems  Constitutional, neuro, ENT, endocrine, pulmonary, cardiac, gastrointestinal, genitourinary, musculoskeletal, integument and psychiatric systems are negative, except as otherwise noted.     Objective    Exam  /72 (BP Location: Left arm, Patient Position: Sitting, Cuff Size: Adult Regular)   Pulse 92   Temp 98.1  F (36.7  C) (Skin)   Resp 16   Ht 1.638 m (5' 4.5\")   Wt 59.1 kg (130 lb 6.4 oz)   LMP  (LMP Unknown)   SpO2 99%   Breastfeeding No   BMI 22.04 kg/m     Estimated body mass index is 22.04 kg/m  as calculated from the following:    Height as of this encounter: 1.638 m (5' 4.5\").    Weight as of this encounter: 59.1 kg (130 lb 6.4 oz).    Physical Exam  GENERAL: alert and no distress  EYES: Eyes grossly normal to inspection, PERRL and " conjunctivae and sclerae normal  HENT: ear canals and TM's normal, nose and mouth without ulcers or lesions  NECK: no adenopathy, no asymmetry, masses, or scars  RESP: lungs clear to auscultation - no rales, rhonchi or wheezes  BREAST: normal without masses, tenderness or nipple discharge and no palpable axillary masses or adenopathy  CV: regular rate and rhythm, normal S1 S2, no S3 or S4, no murmur, click or rub, no peripheral edema  ABDOMEN: soft, nontender, no hepatosplenomegaly, no masses and bowel sounds normal  MS: no gross musculoskeletal defects noted, no edema  SKIN: no suspicious lesions or rashes  NEURO: Normal strength and tone, mentation intact and speech normal  PSYCH: mentation appears normal, affect normal/bright        Signed Electronically by: Nia Chavez MD

## 2025-05-08 ENCOUNTER — LAB (OUTPATIENT)
Dept: LAB | Facility: CLINIC | Age: 55
End: 2025-05-08
Payer: COMMERCIAL

## 2025-05-08 DIAGNOSIS — E83.59 NEPHROCALCINOSIS: ICD-10-CM

## 2025-05-08 DIAGNOSIS — Z13.6 CARDIOVASCULAR SCREENING; LDL GOAL LESS THAN 160: ICD-10-CM

## 2025-05-08 DIAGNOSIS — R31.29 MICROHEMATURIA: ICD-10-CM

## 2025-05-08 DIAGNOSIS — N29 NEPHROCALCINOSIS: ICD-10-CM

## 2025-05-08 DIAGNOSIS — R73.09 ELEVATED GLUCOSE: ICD-10-CM

## 2025-05-08 LAB
ALBUMIN UR-MCNC: NEGATIVE MG/DL
ANION GAP SERPL CALCULATED.3IONS-SCNC: 13 MMOL/L (ref 7–15)
APPEARANCE UR: CLEAR
BACTERIA #/AREA URNS HPF: ABNORMAL /HPF
BILIRUB UR QL STRIP: NEGATIVE
BUN SERPL-MCNC: 10.9 MG/DL (ref 6–20)
CALCIUM SERPL-MCNC: 10.1 MG/DL (ref 8.8–10.4)
CHLORIDE SERPL-SCNC: 98 MMOL/L (ref 98–107)
CHOLEST SERPL-MCNC: 232 MG/DL
COLOR UR AUTO: YELLOW
CREAT SERPL-MCNC: 0.79 MG/DL (ref 0.51–0.95)
CREAT UR-MCNC: 45 MG/DL
EGFRCR SERPLBLD CKD-EPI 2021: 89 ML/MIN/1.73M2
EST. AVERAGE GLUCOSE BLD GHB EST-MCNC: 111 MG/DL
FASTING STATUS PATIENT QL REPORTED: YES
FASTING STATUS PATIENT QL REPORTED: YES
GLUCOSE SERPL-MCNC: 104 MG/DL (ref 70–99)
GLUCOSE UR STRIP-MCNC: NEGATIVE MG/DL
HBA1C MFR BLD: 5.5 % (ref 0–5.6)
HCO3 SERPL-SCNC: 26 MMOL/L (ref 22–29)
HDLC SERPL-MCNC: 83 MG/DL
HGB UR QL STRIP: ABNORMAL
KETONES UR STRIP-MCNC: NEGATIVE MG/DL
LDLC SERPL CALC-MCNC: 126 MG/DL
LEUKOCYTE ESTERASE UR QL STRIP: ABNORMAL
MICROALBUMIN UR-MCNC: <12 MG/L
MICROALBUMIN/CREAT UR: NORMAL MG/G{CREAT}
NITRATE UR QL: NEGATIVE
NONHDLC SERPL-MCNC: 149 MG/DL
PH UR STRIP: 7 [PH] (ref 5–7)
POTASSIUM SERPL-SCNC: 4.1 MMOL/L (ref 3.4–5.3)
RBC #/AREA URNS AUTO: ABNORMAL /HPF
SODIUM SERPL-SCNC: 137 MMOL/L (ref 135–145)
SP GR UR STRIP: 1.01 (ref 1–1.03)
TRIGL SERPL-MCNC: 114 MG/DL
UROBILINOGEN UR STRIP-ACNC: 0.2 E.U./DL
WBC #/AREA URNS AUTO: ABNORMAL /HPF

## 2025-05-15 ENCOUNTER — RESULTS FOLLOW-UP (OUTPATIENT)
Dept: FAMILY MEDICINE | Facility: CLINIC | Age: 55
End: 2025-05-15

## 2025-05-15 DIAGNOSIS — N29 NEPHROCALCINOSIS: ICD-10-CM

## 2025-05-15 DIAGNOSIS — E83.59 NEPHROCALCINOSIS: ICD-10-CM

## 2025-05-15 DIAGNOSIS — R31.29 MICROHEMATURIA: Primary | ICD-10-CM

## 2025-05-15 NOTE — RESULT ENCOUNTER NOTE
Sorry for the delay in my note out to you!  -Your basic metabolic panel (which includes electrolyte levels, blood sugar level and kidney function tests) looks good other than the just slightly elevated fasting glucose of 104.  -Your hemoglobin A1C (the three month average of your glucose levels) still looks good in the normal range at 5.5, and stable as the last one was 5.4 a few years ago, but it is creeping closer to the pre-diabetic range (5.7-6.4).  Cutting back in sweets, added sugars, and white/simple carbohydrates can help.  -Your cholesterol panel looks good with a fairly low LDL (the bad cholesterol) and a good/high HDL (the good cholesterol).   -Your urine microalbumin level (which is the urine test that can signal signs of early chronic kidney disease if elevated) is very low which is good.  -Your urine tests still showed a bit of red blood cells, but the level is still stable at 5-10, so you do not need to go back and see urology.    I placed a lab order for the six month follow-up UA and basic metabolic panel.  If you have any concerns, I would schedule an appointment to see me (can be video) for a day or two after the labs.  If you are doing well and without questions, it's likely fine to just do the labs.    Please let me know if you have any questions.  Best,   Gulshan Chavez MD

## 2025-07-17 ENCOUNTER — THERAPY VISIT (OUTPATIENT)
Age: 55
End: 2025-07-17
Payer: COMMERCIAL

## 2025-07-17 DIAGNOSIS — N94.10 DYSPAREUNIA IN FEMALE: Primary | ICD-10-CM

## 2025-07-17 NOTE — PROGRESS NOTES
PHYSICAL THERAPY EVALUATION  Type of Visit: Evaluation       Fall Risk Screen:  Have you fallen 2 or more times in the past year?: No  Have you fallen and had an injury in the past year?: No    Subjective         Presenting condition or subjective complaint: Pain during intercourse  Date of onset:      Relevant medical history:     Dates & types of surgery:      Prior diagnostic imaging/testing results: Other Ultra sound maybe 10 years ago   Prior therapy history for the same diagnosis, illness or injury: Yes Over 10 years ago, not sure what type    Prior Level of Function  Transfers:   Ambulation:   ADL:   IADL:     Living Environment  Social support: Alone   Type of home: House   Stairs to enter the home: No       Ramp: No   Stairs inside the home: Yes 40 Is there a railing: Yes     Help at home: None  Equipment owned:       Employment: Yes   Hobbies/Interests: Running, biking, hiking, kayaking  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 Was  and had pain with sex back then.  Got .  Went to see OB/GYN.  Went through menopause.  Had an ultrasound. Went to PT 10 years ago who recommended a dilator.  Had a boyfriend and sex was better.  Just started estrogen again and putting it on dilator to insert.  Position makes a difference. Also saw a urologist.  Running 4-5 days a week.  Cycling 1-2 times a week.  Does a warrior sculpt class.    Patient goals for therapy: Have pain free sex    Pain assessment: See objective evaluation for additional pain details     Objective      PELVIC EVALUATION  ADDITIONAL HISTORY:  Sex assigned at birth: Female  Gender identity: Female    Pronouns: She/Her Hers      Bladder History:  Feels bladder filling: No  Triggers for feeling of inability to wait to go to the bathroom: No    How long can you wait to urinate: 3 hours  Gets up at night to urinate: No    Can stop the flow of urine when urinating: Yes  Volume of urine  usually released: Medium   Other issues:    Number of bladder infections in last 12 months:    Fluid intake per day: 80 to 100 oz 20 oz    Medications taken for bladder: No     Activities causing urine leak:      Amount of urine typically leaked:    Pads used to help with leaking: No        Bowel History:  Frequency of bowel movement: 1/ day  Consistency of stool: Other Sometimes hard, sometimes softer  Ignores the urge to defecate: No  Other bowel issues:    Length of time spent trying to have a bowel movement:      Sexual Function History:  Sexual orientation: Straight    Sexually active: No  Lubrication used: Yes Yes  Pelvic pain: Initial penetration (rectal or vaginal); Deep penetration (rectal or vaginal); Pelvic exams    Pain or difficulty with orgasms/erection/ejaculation: No    State of menopause: During menopause (I am in menopause now)  Hormone medications: No      Are you currently pregnant: No  Number of previous pregnancies: 0  Number of deliveries: 0  Have you been diagnosed with pelvic prolapse or abdominal separation: No  Do you get regular exercise: Yes  I do this type of exercise: Run, yoga/weights  Have you tried pelvic floor strengthening exercises for 4 weeks: No  Do you have any history of trauma that is relevant to your care that you d like to share: No      Discussed reason for referral regarding pelvic health needs and external/internal pelvic floor muscle examination with patient/guardian.  Opportunity provided to ask questions and verbal consent for assessment and intervention was given.    PAIN: Pain Location: vaginal  Pain Quality: Aching, Burning, and Sharp  Pain is Exacerbated By: pelvic exam, previously inserting a tampon, and vaginal intercourse  POSTURE:   LUMBAR SCREEN: AROM WNL  HIP SCREEN:  Strength: WFL   Functional Strength Testing:     PELVIC/SI SCREEN:     PAIN PROVOCATION TEST:   PELVIS/SI SPECIAL TESTS:   BREATHING SYMMETRY:     PELVIC EXAM  External Visual Inspection:  At  rest: Elevated perineal body  With voluntary pelvic floor contraction: Uncertain  Relaxation of PFM: Non-relaxation    Integumentary:   Introitus: Tight    External Digital Palpation per Perineum:   Ischiocavernosis: Unremarkable  Bulbo cavernosis: Unremarkable  Transverse perineal: Tightness  Levator ani: Tightness  Perineal body: Tightness    Scar:   Location/Type:   Mobility:     Internal Digital Palpation:  Per Vagina:  Tenderness  Myofascial Resistance to Palpation: Firm  Digital Muscle Performance: P (Power): 1  Compensations: Adductors, Gluts  Relaxation Post-Contraction: Non-relaxation    Per Rectum:        Pelvic Organ Prolapse:       ABDOMINAL ASSESSMENT  Diastasis Rectus Abdominis (JESSIE):      Abdominal Activation/Strength: guarding    Scar:   Location/Type:   Mobility:     Fascial Tension/Restriction:     BIOFEEDBACK:  Position:   Surface Electrodes:     Abdominals:     Perianals:       DERMATOMES:   DTR S:     Assessment & Plan   CLINICAL IMPRESSIONS  Medical Diagnosis: Dyspareunia in female    Treatment Diagnosis: Dyspareunia in female   Impression/Assessment: Patient is a 54 year old female with pelvic muscle complaints.  The following significant findings have been identified: Pain, Decreased ROM/flexibility, Impaired muscle performance, and Decreased activity tolerance. These impairments interfere with their ability to perform self care tasks as compared to previous level of function.     Clinical Decision Making (Complexity):  Clinical Presentation: Stable/Uncomplicated  Clinical Presentation Rationale: based on medical and personal factors listed in PT evaluation  Clinical Decision Making (Complexity): Low complexity    PLAN OF CARE  Treatment Interventions:  Modalities: Biofeedback, E-stim  Interventions: Manual Therapy, Neuromuscular Re-education, Therapeutic Activity, Therapeutic Exercise    Long Term Goals     PT Goal 1  Goal Identifier: intercourse/pelvic exam  Goal Description: able to have  vaginal intercouse and a pelvic exam with pain 0-1/10  Rationale: to maximize safety and independence with performance of ADLs and functional tasks  Goal Progress: currently has pain with pelvic exam and using small dilator  Target Date: 09/15/25      Frequency of Treatment: 2 times a month  Duration of Treatment: 3 months    Recommended Referrals to Other Professionals:   Education Assessment:   Learner/Method: Patient;Listening;Reading;Demonstration;Pictures/Video;No Barriers to Learning    Risks and benefits of evaluation/treatment have been explained.   Patient/Family/caregiver agrees with Plan of Care.     Evaluation Time:     PT Eval, Low Complexity Minutes (80709): 18       Signing Clinician: Edith Velez PT

## 2025-08-07 ENCOUNTER — THERAPY VISIT (OUTPATIENT)
Age: 55
End: 2025-08-07
Attending: FAMILY MEDICINE
Payer: COMMERCIAL

## 2025-08-07 DIAGNOSIS — N94.10 DYSPAREUNIA IN FEMALE: ICD-10-CM

## 2025-08-21 ENCOUNTER — THERAPY VISIT (OUTPATIENT)
Age: 55
End: 2025-08-21
Attending: FAMILY MEDICINE
Payer: COMMERCIAL

## 2025-08-21 DIAGNOSIS — N94.10 DYSPAREUNIA IN FEMALE: Primary | ICD-10-CM

## (undated) RX ORDER — LIDOCAINE HYDROCHLORIDE 20 MG/ML
JELLY TOPICAL
Status: DISPENSED
Start: 2021-06-09